# Patient Record
Sex: MALE | Race: WHITE | Employment: UNEMPLOYED | URBAN - METROPOLITAN AREA
[De-identification: names, ages, dates, MRNs, and addresses within clinical notes are randomized per-mention and may not be internally consistent; named-entity substitution may affect disease eponyms.]

---

## 2017-10-27 ENCOUNTER — OFFICE VISIT (OUTPATIENT)
Dept: PRIMARY CARE CLINIC | Age: 52
End: 2017-10-27

## 2017-10-27 VITALS
WEIGHT: 187 LBS | HEART RATE: 73 BPM | HEIGHT: 68 IN | SYSTOLIC BLOOD PRESSURE: 160 MMHG | BODY MASS INDEX: 28.34 KG/M2 | DIASTOLIC BLOOD PRESSURE: 104 MMHG | RESPIRATION RATE: 16 BRPM | OXYGEN SATURATION: 98 % | TEMPERATURE: 96.4 F

## 2017-10-27 DIAGNOSIS — R73.9 HYPERGLYCEMIA: ICD-10-CM

## 2017-10-27 DIAGNOSIS — Z00.00 PREVENTATIVE HEALTH CARE: ICD-10-CM

## 2017-10-27 DIAGNOSIS — G44.209 TENSION HEADACHE: ICD-10-CM

## 2017-10-27 DIAGNOSIS — I10 ESSENTIAL HYPERTENSION: ICD-10-CM

## 2017-10-27 DIAGNOSIS — F41.9 ANXIETY: Primary | ICD-10-CM

## 2017-10-27 DIAGNOSIS — Z12.5 PROSTATE CANCER SCREENING: ICD-10-CM

## 2017-10-27 DIAGNOSIS — Z13.31 POSITIVE DEPRESSION SCREENING: ICD-10-CM

## 2017-10-27 LAB
ALBUMIN SERPL-MCNC: 4.5 G/DL (ref 3.9–4.9)
ALP BLD-CCNC: 66 U/L (ref 35–104)
ALT SERPL-CCNC: 39 U/L (ref 0–41)
ANION GAP SERPL CALCULATED.3IONS-SCNC: 19 MEQ/L (ref 7–13)
AST SERPL-CCNC: 22 U/L (ref 0–40)
BASOPHILS ABSOLUTE: 0 K/UL (ref 0–0.2)
BASOPHILS RELATIVE PERCENT: 0.9 %
BILIRUB SERPL-MCNC: 0.3 MG/DL (ref 0–1.2)
BUN BLDV-MCNC: 12 MG/DL (ref 6–20)
CALCIUM SERPL-MCNC: 9.7 MG/DL (ref 8.6–10.2)
CHLORIDE BLD-SCNC: 95 MEQ/L (ref 98–107)
CHOLESTEROL, TOTAL: 278 MG/DL (ref 0–199)
CO2: 23 MEQ/L (ref 22–29)
CREAT SERPL-MCNC: 0.9 MG/DL (ref 0.7–1.2)
EOSINOPHILS ABSOLUTE: 0.1 K/UL (ref 0–0.7)
EOSINOPHILS RELATIVE PERCENT: 1.3 %
GFR AFRICAN AMERICAN: >60
GFR NON-AFRICAN AMERICAN: >60
GLOBULIN: 3.2 G/DL (ref 2.3–3.5)
GLUCOSE BLD-MCNC: 87 MG/DL (ref 74–109)
HBA1C MFR BLD: 5.3 % (ref 4.8–5.9)
HCT VFR BLD CALC: 49.8 % (ref 42–52)
HDLC SERPL-MCNC: 61 MG/DL (ref 40–59)
HEMOGLOBIN: 16.8 G/DL (ref 14–18)
LDL CHOLESTEROL CALCULATED: 184 MG/DL (ref 0–129)
LYMPHOCYTES ABSOLUTE: 1.2 K/UL (ref 1–4.8)
LYMPHOCYTES RELATIVE PERCENT: 25.8 %
MCH RBC QN AUTO: 32.6 PG (ref 27–31.3)
MCHC RBC AUTO-ENTMCNC: 33.8 % (ref 33–37)
MCV RBC AUTO: 96.5 FL (ref 80–100)
MONOCYTES ABSOLUTE: 0.5 K/UL (ref 0.2–0.8)
MONOCYTES RELATIVE PERCENT: 10.7 %
NEUTROPHILS ABSOLUTE: 2.9 K/UL (ref 1.4–6.5)
NEUTROPHILS RELATIVE PERCENT: 61.3 %
PDW BLD-RTO: 13.1 % (ref 11.5–14.5)
PLATELET # BLD: 281 K/UL (ref 130–400)
POTASSIUM SERPL-SCNC: 4.7 MEQ/L (ref 3.5–5.1)
PROSTATE SPECIFIC ANTIGEN: 0.67 NG/ML (ref 0–3.89)
RBC # BLD: 5.16 M/UL (ref 4.7–6.1)
SODIUM BLD-SCNC: 137 MEQ/L (ref 132–144)
TOTAL PROTEIN: 7.7 G/DL (ref 6.4–8.1)
TRIGL SERPL-MCNC: 165 MG/DL (ref 0–200)
TSH SERPL DL<=0.05 MIU/L-ACNC: 1 UIU/ML (ref 0.27–4.2)
WBC # BLD: 4.7 K/UL (ref 4.8–10.8)

## 2017-10-27 PROCEDURE — G8431 POS CLIN DEPRES SCRN F/U DOC: HCPCS | Performed by: INTERNAL MEDICINE

## 2017-10-27 PROCEDURE — 99214 OFFICE O/P EST MOD 30 MIN: CPT | Performed by: INTERNAL MEDICINE

## 2017-10-27 RX ORDER — DIAZEPAM 5 MG/1
5 TABLET ORAL 2 TIMES DAILY PRN
Qty: 60 TABLET | Refills: 2 | Status: SHIPPED | OUTPATIENT
Start: 2017-10-27 | End: 2017-12-26

## 2017-10-27 RX ORDER — LISINOPRIL 20 MG/1
20 TABLET ORAL DAILY
Qty: 30 TABLET | Refills: 5 | Status: SHIPPED | OUTPATIENT
Start: 2017-10-27 | End: 2018-07-30 | Stop reason: SDUPTHER

## 2017-10-27 ASSESSMENT — PATIENT HEALTH QUESTIONNAIRE - PHQ9
4. FEELING TIRED OR HAVING LITTLE ENERGY: 0
SUM OF ALL RESPONSES TO PHQ QUESTIONS 1-9: 11
2. FEELING DOWN, DEPRESSED OR HOPELESS: 3
8. MOVING OR SPEAKING SO SLOWLY THAT OTHER PEOPLE COULD HAVE NOTICED. OR THE OPPOSITE, BEING SO FIGETY OR RESTLESS THAT YOU HAVE BEEN MOVING AROUND A LOT MORE THAN USUAL: 2
7. TROUBLE CONCENTRATING ON THINGS, SUCH AS READING THE NEWSPAPER OR WATCHING TELEVISION: 1
1. LITTLE INTEREST OR PLEASURE IN DOING THINGS: 3
9. THOUGHTS THAT YOU WOULD BE BETTER OFF DEAD, OR OF HURTING YOURSELF: 0
3. TROUBLE FALLING OR STAYING ASLEEP: 2
10. IF YOU CHECKED OFF ANY PROBLEMS, HOW DIFFICULT HAVE THESE PROBLEMS MADE IT FOR YOU TO DO YOUR WORK, TAKE CARE OF THINGS AT HOME, OR GET ALONG WITH OTHER PEOPLE: 1
5. POOR APPETITE OR OVEREATING: 0
6. FEELING BAD ABOUT YOURSELF - OR THAT YOU ARE A FAILURE OR HAVE LET YOURSELF OR YOUR FAMILY DOWN: 0
SUM OF ALL RESPONSES TO PHQ9 QUESTIONS 1 & 2: 6

## 2017-10-30 ENCOUNTER — TELEPHONE (OUTPATIENT)
Dept: PRIMARY CARE CLINIC | Age: 52
End: 2017-10-30

## 2017-10-30 DIAGNOSIS — R59.0 LYMPHADENOPATHY, CERVICAL: Primary | ICD-10-CM

## 2017-10-30 DIAGNOSIS — E78.00 PURE HYPERCHOLESTEROLEMIA: Primary | ICD-10-CM

## 2017-10-30 RX ORDER — ATORVASTATIN CALCIUM 10 MG/1
10 TABLET, FILM COATED ORAL DAILY
Qty: 30 TABLET | Refills: 5 | Status: SHIPPED | OUTPATIENT
Start: 2017-10-30 | End: 2018-07-30 | Stop reason: SDUPTHER

## 2017-10-30 ASSESSMENT — ENCOUNTER SYMPTOMS
VOICE CHANGE: 0
SHORTNESS OF BREATH: 0
PHOTOPHOBIA: 0
TROUBLE SWALLOWING: 0
CHOKING: 0
VOMITING: 0
NAUSEA: 0
BLURRED VISION: 0

## 2018-01-05 DIAGNOSIS — N52.8 OTHER MALE ERECTILE DYSFUNCTION: ICD-10-CM

## 2018-01-07 RX ORDER — TADALAFIL 5 MG/1
5 TABLET ORAL DAILY
Qty: 30 TABLET | Refills: 5 | Status: SHIPPED | OUTPATIENT
Start: 2018-01-07 | End: 2018-07-30 | Stop reason: SDUPTHER

## 2018-01-08 ENCOUNTER — TELEPHONE (OUTPATIENT)
Dept: PRIMARY CARE CLINIC | Age: 53
End: 2018-01-08

## 2018-07-30 ENCOUNTER — OFFICE VISIT (OUTPATIENT)
Dept: PRIMARY CARE CLINIC | Age: 53
End: 2018-07-30
Payer: COMMERCIAL

## 2018-07-30 VITALS
WEIGHT: 178 LBS | BODY MASS INDEX: 26.98 KG/M2 | DIASTOLIC BLOOD PRESSURE: 80 MMHG | HEIGHT: 68 IN | SYSTOLIC BLOOD PRESSURE: 158 MMHG | HEART RATE: 100 BPM | RESPIRATION RATE: 16 BRPM

## 2018-07-30 DIAGNOSIS — E78.00 PURE HYPERCHOLESTEROLEMIA: ICD-10-CM

## 2018-07-30 DIAGNOSIS — F98.8 ATTENTION DEFICIT DISORDER (ADD) IN ADULT: ICD-10-CM

## 2018-07-30 DIAGNOSIS — F41.9 ANXIETY: ICD-10-CM

## 2018-07-30 DIAGNOSIS — K21.9 GASTROESOPHAGEAL REFLUX DISEASE WITHOUT ESOPHAGITIS: ICD-10-CM

## 2018-07-30 DIAGNOSIS — I10 ESSENTIAL HYPERTENSION: Primary | ICD-10-CM

## 2018-07-30 DIAGNOSIS — N52.8 OTHER MALE ERECTILE DYSFUNCTION: ICD-10-CM

## 2018-07-30 PROCEDURE — 99214 OFFICE O/P EST MOD 30 MIN: CPT | Performed by: INTERNAL MEDICINE

## 2018-07-30 RX ORDER — ESOMEPRAZOLE MAGNESIUM 20 MG/1
20 FOR SUSPENSION ORAL DAILY
Qty: 30 PACKET | Refills: 11 | Status: SHIPPED | OUTPATIENT
Start: 2018-07-30 | End: 2020-07-14

## 2018-07-30 RX ORDER — TADALAFIL 5 MG/1
5 TABLET ORAL DAILY
Qty: 30 TABLET | Refills: 11 | Status: SHIPPED | OUTPATIENT
Start: 2018-07-30 | End: 2020-07-14

## 2018-07-30 RX ORDER — ATORVASTATIN CALCIUM 10 MG/1
10 TABLET, FILM COATED ORAL DAILY
Qty: 30 TABLET | Refills: 11 | Status: SHIPPED | OUTPATIENT
Start: 2018-07-30 | End: 2020-07-14

## 2018-07-30 RX ORDER — ESOMEPRAZOLE MAGNESIUM 20 MG/1
20 FOR SUSPENSION ORAL DAILY
COMMUNITY
End: 2018-07-30 | Stop reason: SDUPTHER

## 2018-07-30 RX ORDER — LISINOPRIL 20 MG/1
20 TABLET ORAL DAILY
Qty: 30 TABLET | Refills: 11 | Status: SHIPPED | OUTPATIENT
Start: 2018-07-30 | End: 2018-08-08 | Stop reason: SDUPTHER

## 2018-07-30 RX ORDER — DIAZEPAM 5 MG/1
5 TABLET ORAL 2 TIMES DAILY PRN
Qty: 60 TABLET | Refills: 2 | Status: SHIPPED | OUTPATIENT
Start: 2018-07-30 | End: 2020-07-14 | Stop reason: SDUPTHER

## 2018-07-30 NOTE — PROGRESS NOTES
Alyson Lake 48 y.o. male presents today with   Chief Complaint   Patient presents with    Nephrolithiasis     Pt admits to kidney stones, drink a lot of water and waits until it passes. Pt having pain    Chest Pain     Pt admits to chest pains x1 week, tightness, Tingling in the hands. Pt went to [facility] Tuesday morning, Kept for 12 hours.  Hypertension     Pt here for follow up on blood pressure, would like a refill on Lisinopril and discuss the medication.  Hyperlipidemia     Pt here for follow up on Cholesterol, Pt would like a refill on Atorvastatin and discuss the medication. Mental Health Problem   The primary symptoms include dysphoric mood and somatic symptoms. The current episode started more than 1 month ago. This is a recurrent problem. Somatic symptoms include heartburn. Somatic symptoms do not include fatigue or headaches. The onset of the illness is precipitated by emotional stress and a stressful event. The degree of incapacity that he is experiencing as a consequence of his illness is mild. Additional symptoms of the illness include anhedonia, insomnia, attention impairment and distractible. Additional symptoms of the illness do not include fatigue or headaches. Hyperlipidemia   This is a chronic problem. The current episode started more than 1 year ago. The problem is controlled. Recent lipid tests were reviewed and are normal. Associated symptoms include chest pain. Pertinent negatives include no shortness of breath. Current antihyperlipidemic treatment includes statins. The current treatment provides significant improvement of lipids. Hypertension   This is a chronic problem. The current episode started more than 1 year ago. The problem is unchanged. The problem is controlled. Associated symptoms include anxiety and chest pain. Pertinent negatives include no headaches, palpitations or shortness of breath.    Gastroesophageal Reflux   He complains of chest pain

## 2018-08-02 ASSESSMENT — ENCOUNTER SYMPTOMS
PHOTOPHOBIA: 0
NAUSEA: 0
VOMITING: 0
HEARTBURN: 1
SHORTNESS OF BREATH: 0
VOICE CHANGE: 0
TROUBLE SWALLOWING: 0
CHOKING: 0

## 2018-08-07 ENCOUNTER — TELEPHONE (OUTPATIENT)
Dept: PRIMARY CARE CLINIC | Age: 53
End: 2018-08-07

## 2018-08-08 DIAGNOSIS — I10 ESSENTIAL HYPERTENSION: ICD-10-CM

## 2018-08-08 RX ORDER — LISINOPRIL 40 MG/1
40 TABLET ORAL DAILY
Qty: 30 TABLET | Refills: 2 | Status: SHIPPED | OUTPATIENT
Start: 2018-08-08 | End: 2018-08-13 | Stop reason: SDUPTHER

## 2018-08-13 ENCOUNTER — OFFICE VISIT (OUTPATIENT)
Dept: PRIMARY CARE CLINIC | Age: 53
End: 2018-08-13
Payer: COMMERCIAL

## 2018-08-13 VITALS
TEMPERATURE: 98.5 F | RESPIRATION RATE: 12 BRPM | BODY MASS INDEX: 27.13 KG/M2 | SYSTOLIC BLOOD PRESSURE: 138 MMHG | DIASTOLIC BLOOD PRESSURE: 80 MMHG | HEART RATE: 80 BPM | WEIGHT: 179 LBS | HEIGHT: 68 IN

## 2018-08-13 DIAGNOSIS — Z13.31 POSITIVE DEPRESSION SCREENING: ICD-10-CM

## 2018-08-13 DIAGNOSIS — N20.0 KIDNEY STONE ON LEFT SIDE: Primary | ICD-10-CM

## 2018-08-13 DIAGNOSIS — I10 ESSENTIAL HYPERTENSION: ICD-10-CM

## 2018-08-13 PROCEDURE — G8431 POS CLIN DEPRES SCRN F/U DOC: HCPCS | Performed by: INTERNAL MEDICINE

## 2018-08-13 PROCEDURE — 99214 OFFICE O/P EST MOD 30 MIN: CPT | Performed by: INTERNAL MEDICINE

## 2018-08-13 RX ORDER — OXYCODONE AND ACETAMINOPHEN 10; 325 MG/1; MG/1
1 TABLET ORAL EVERY 6 HOURS PRN
Qty: 28 TABLET | Refills: 0 | Status: SHIPPED | OUTPATIENT
Start: 2018-08-13 | End: 2018-08-28 | Stop reason: SDUPTHER

## 2018-08-13 RX ORDER — HYDROCHLOROTHIAZIDE 25 MG/1
25 TABLET ORAL DAILY PRN
Qty: 30 TABLET | Refills: 5 | Status: SHIPPED | OUTPATIENT
Start: 2018-08-13 | End: 2020-07-14

## 2018-08-13 RX ORDER — LISINOPRIL 40 MG/1
40 TABLET ORAL DAILY
Qty: 30 TABLET | Refills: 5 | Status: SHIPPED | OUTPATIENT
Start: 2018-08-13 | End: 2020-07-14

## 2018-08-13 NOTE — PROGRESS NOTES
Roby Quijano 48 y.o. male presents today with   Chief Complaint   Patient presents with    Nephrolithiasis     Pt admits to kidney stones, Pt in a lot of pain, Drinking a lot of water, waiting for it to pass. Pt has lack of sleep due to severe pain. Flank Pain   The current episode started 1 to 4 weeks ago. The problem occurs constantly. The problem has been waxing and waning since onset. The pain is present in the lumbar spine. The pain is at a severity of 7/10. The pain is severe. Pertinent negatives include no chest pain, fever or headaches. Hypertension   This is a chronic problem. The current episode started more than 1 year ago. The problem is unchanged. The problem is controlled. Associated symptoms include anxiety. Pertinent negatives include no chest pain, headaches, palpitations or shortness of breath. There are no associated agents to hypertension. Past Medical History:   Diagnosis Date    Alcohol abuse, in remission     Backache, unspecified     Chronic gastric ulcer without mention of hemorrhage or perforation, with obstruction     Decreased libido     Depressive disorder, not elsewhere classified     Generalized anxiety disorder     Other malaise and fatigue     Reflux esophagitis     Unspecified essential hypertension     Uric acid nephrolithiasis      Patient Active Problem List    Diagnosis Date Noted    Avascular necrosis of bone ([facility] [address] 07/23/2015    Effusion of joint 07/15/2015    Acute medial meniscal tear 07/15/2015    Kidney stones 12/22/2011     Past Surgical History:   Procedure Laterality Date    ANTERIOR CRUCIATE LIGAMENT REPAIR      BACK SURGERY      COLONOSCOPY  2012    LITHOTRIPSY       No family history on file.   Social History     Social History    Marital status: Single     Spouse name: N/A    Number of children: N/A    Years of education: N/A     Social History Main Topics    Smoking status: Current Every Day Smoker     Packs/day: 0.50     Years: 35.00    Smokeless tobacco: Never Used    Alcohol use No    Drug use: Unknown    Sexual activity: Not Asked     Other Topics Concern    None     Social History Narrative    None     No Known Allergies    Review of Systems   Constitutional: Negative for fatigue and fever. HENT: Negative for trouble swallowing and voice change. Eyes: Negative for photophobia and visual disturbance. Respiratory: Negative for choking and shortness of breath. Cardiovascular: Negative for chest pain and palpitations. Gastrointestinal: Negative for nausea and vomiting. Genitourinary: Positive for flank pain. Negative for decreased urine volume, testicular pain and urgency. Skin: Negative for rash. Neurological: Negative for tremors, syncope and headaches. Hematological: Does not bruise/bleed easily. Psychiatric/Behavioral: Negative for suicidal ideas. Vitals:    08/13/18 1511   BP: 138/80   Site: Right Arm   Position: Sitting   Cuff Size: Medium Adult   Pulse: 80   Resp: 12   Temp: 98.5 °F (36.9 °C)   Weight: 179 lb (81.2 kg)   Height: 5' 7.5\" (1.715 m)       Physical Exam   Constitutional: He appears well-developed and well-nourished. HENT:   Head: Normocephalic and atraumatic. Eyes: Pupils are equal, round, and reactive to light. Conjunctivae and EOM are normal.   Neck: Normal range of motion. No thyromegaly present. Cardiovascular: Normal rate and regular rhythm. Pulmonary/Chest: Effort normal. No respiratory distress. He has no wheezes. Abdominal: Soft. He exhibits no distension. There is no tenderness. Musculoskeletal: Normal range of motion. Neurological: He is alert. Skin: Skin is dry. Assessment/Plan  Krystyna Solano was seen today for nephrolithiasis. Diagnoses and all orders for this visit:    Kidney stone on left side  -     oxyCODONE-acetaminophen (PERCOCET)  MG per tablet; Take 1 tablet by mouth every 6 hours as needed for Pain for up to 7 days.  Intended supply: 30 days.  -     CT ABDOMEN PELVIS WO CONTRAST Additional Contrast? None    Essential hypertension  -     hydrochlorothiazide (HYDRODIURIL) 25 MG tablet; Take 1 tablet by mouth daily as needed (PRN)  -     lisinopril (PRINIVIL;ZESTRIL) 40 MG tablet; Take 1 tablet by mouth daily    Positive depression screening  -     Positive Screen for Clinical Depression with a Documented Follow-up Plan       Return in about 3 months (around 11/13/2018), or if symptoms worsen or fail to improve.     Pepper Razo MD      On the basis of positive PHQ-9 screening ( ), the following plan was implemented: discussed with patient

## 2018-08-16 ENCOUNTER — OFFICE VISIT (OUTPATIENT)
Dept: BEHAVIORAL/MENTAL HEALTH CLINIC | Age: 53
End: 2018-08-16
Payer: COMMERCIAL

## 2018-08-16 DIAGNOSIS — Z13.39 ADHD (ATTENTION DEFICIT HYPERACTIVITY DISORDER) EVALUATION: Primary | ICD-10-CM

## 2018-08-16 PROCEDURE — 90791 PSYCH DIAGNOSTIC EVALUATION: CPT | Performed by: PSYCHOLOGIST

## 2018-08-16 ASSESSMENT — ANXIETY QUESTIONNAIRES
7. FEELING AFRAID AS IF SOMETHING AWFUL MIGHT HAPPEN: 2-OVER HALF THE DAYS
6. BECOMING EASILY ANNOYED OR IRRITABLE: 3-NEARLY EVERY DAY
5. BEING SO RESTLESS THAT IT IS HARD TO SIT STILL: 3-NEARLY EVERY DAY
GAD7 TOTAL SCORE: 20
2. NOT BEING ABLE TO STOP OR CONTROL WORRYING: 3-NEARLY EVERY DAY
3. WORRYING TOO MUCH ABOUT DIFFERENT THINGS: 3-NEARLY EVERY DAY
4. TROUBLE RELAXING: 3-NEARLY EVERY DAY
1. FEELING NERVOUS, ANXIOUS, OR ON EDGE: 3-NEARLY EVERY DAY

## 2018-08-16 ASSESSMENT — ENCOUNTER SYMPTOMS
CHOKING: 0
NAUSEA: 0
SHORTNESS OF BREATH: 0
VOICE CHANGE: 0
TROUBLE SWALLOWING: 0
PHOTOPHOBIA: 0
VOMITING: 0

## 2018-08-16 ASSESSMENT — PATIENT HEALTH QUESTIONNAIRE - PHQ9
6. FEELING BAD ABOUT YOURSELF - OR THAT YOU ARE A FAILURE OR HAVE LET YOURSELF OR YOUR FAMILY DOWN: 2
SUM OF ALL RESPONSES TO PHQ QUESTIONS 1-9: 22
3. TROUBLE FALLING OR STAYING ASLEEP: 3
5. POOR APPETITE OR OVEREATING: 2
9. THOUGHTS THAT YOU WOULD BE BETTER OFF DEAD, OR OF HURTING YOURSELF: 1
8. MOVING OR SPEAKING SO SLOWLY THAT OTHER PEOPLE COULD HAVE NOTICED. OR THE OPPOSITE, BEING SO FIGETY OR RESTLESS THAT YOU HAVE BEEN MOVING AROUND A LOT MORE THAN USUAL: 2
SUM OF ALL RESPONSES TO PHQ QUESTIONS 1-9: 22
2. FEELING DOWN, DEPRESSED OR HOPELESS: 3
SUM OF ALL RESPONSES TO PHQ9 QUESTIONS 1 & 2: 6
7. TROUBLE CONCENTRATING ON THINGS, SUCH AS READING THE NEWSPAPER OR WATCHING TELEVISION: 3
4. FEELING TIRED OR HAVING LITTLE ENERGY: 3
1. LITTLE INTEREST OR PLEASURE IN DOING THINGS: 3
10. IF YOU CHECKED OFF ANY PROBLEMS, HOW DIFFICULT HAVE THESE PROBLEMS MADE IT FOR YOU TO DO YOUR WORK, TAKE CARE OF THINGS AT HOME, OR GET ALONG WITH OTHER PEOPLE: 3
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
## 2018-08-28 ENCOUNTER — OFFICE VISIT (OUTPATIENT)
Dept: PRIMARY CARE CLINIC | Age: 53
End: 2018-08-28
Payer: COMMERCIAL

## 2018-08-28 ENCOUNTER — TELEPHONE (OUTPATIENT)
Dept: BEHAVIORAL/MENTAL HEALTH CLINIC | Age: 53
End: 2018-08-28

## 2018-08-28 ENCOUNTER — OFFICE VISIT (OUTPATIENT)
Dept: BEHAVIORAL/MENTAL HEALTH CLINIC | Age: 53
End: 2018-08-28
Payer: COMMERCIAL

## 2018-08-28 VITALS
TEMPERATURE: 98 F | HEART RATE: 77 BPM | BODY MASS INDEX: 28.41 KG/M2 | RESPIRATION RATE: 18 BRPM | OXYGEN SATURATION: 98 % | SYSTOLIC BLOOD PRESSURE: 138 MMHG | WEIGHT: 181 LBS | DIASTOLIC BLOOD PRESSURE: 90 MMHG | HEIGHT: 67 IN

## 2018-08-28 DIAGNOSIS — F41.9 ANXIETY: ICD-10-CM

## 2018-08-28 DIAGNOSIS — F98.8 ATTENTION DEFICIT DISORDER (ADD) IN ADULT: Primary | ICD-10-CM

## 2018-08-28 DIAGNOSIS — F17.200 SMOKER: ICD-10-CM

## 2018-08-28 DIAGNOSIS — F90.9 ATTENTION DEFICIT HYPERACTIVITY DISORDER (ADHD), UNSPECIFIED ADHD TYPE: ICD-10-CM

## 2018-08-28 DIAGNOSIS — Z12.11 COLON CANCER SCREENING: ICD-10-CM

## 2018-08-28 DIAGNOSIS — N20.0 KIDNEY STONE ON LEFT SIDE: ICD-10-CM

## 2018-08-28 DIAGNOSIS — F32.A DEPRESSION, UNSPECIFIED DEPRESSION TYPE: ICD-10-CM

## 2018-08-28 PROCEDURE — 99214 OFFICE O/P EST MOD 30 MIN: CPT | Performed by: INTERNAL MEDICINE

## 2018-08-28 PROCEDURE — 90832 PSYTX W PT 30 MINUTES: CPT | Performed by: PSYCHOLOGIST

## 2018-08-28 RX ORDER — OXYCODONE AND ACETAMINOPHEN 10; 325 MG/1; MG/1
1 TABLET ORAL EVERY 6 HOURS PRN
Qty: 20 TABLET | Refills: 0 | Status: SHIPPED | OUTPATIENT
Start: 2018-08-28 | End: 2018-09-02

## 2018-08-28 RX ORDER — VARENICLINE TARTRATE 1 MG/1
1 TABLET, FILM COATED ORAL 2 TIMES DAILY
Qty: 60 TABLET | Refills: 5 | Status: SHIPPED | OUTPATIENT
Start: 2018-08-28 | End: 2020-07-14

## 2018-08-28 RX ORDER — VARENICLINE TARTRATE 25 MG
KIT ORAL
Qty: 1 EACH | Refills: 0 | Status: SHIPPED | OUTPATIENT
Start: 2018-08-28 | End: 2020-07-14

## 2018-08-28 RX ORDER — DEXTROAMPHETAMINE SACCHARATE, AMPHETAMINE ASPARTATE, DEXTROAMPHETAMINE SULFATE AND AMPHETAMINE SULFATE 2.5; 2.5; 2.5; 2.5 MG/1; MG/1; MG/1; MG/1
10 TABLET ORAL 2 TIMES DAILY
Qty: 60 TABLET | Refills: 0 | Status: SHIPPED | OUTPATIENT
Start: 2018-08-28 | End: 2020-07-14

## 2018-08-28 ASSESSMENT — PATIENT HEALTH QUESTIONNAIRE - PHQ9
4. FEELING TIRED OR HAVING LITTLE ENERGY: 1
8. MOVING OR SPEAKING SO SLOWLY THAT OTHER PEOPLE COULD HAVE NOTICED. OR THE OPPOSITE, BEING SO FIGETY OR RESTLESS THAT YOU HAVE BEEN MOVING AROUND A LOT MORE THAN USUAL: 1
2. FEELING DOWN, DEPRESSED OR HOPELESS: 1
5. POOR APPETITE OR OVEREATING: 1
6. FEELING BAD ABOUT YOURSELF - OR THAT YOU ARE A FAILURE OR HAVE LET YOURSELF OR YOUR FAMILY DOWN: 1
10. IF YOU CHECKED OFF ANY PROBLEMS, HOW DIFFICULT HAVE THESE PROBLEMS MADE IT FOR YOU TO DO YOUR WORK, TAKE CARE OF THINGS AT HOME, OR GET ALONG WITH OTHER PEOPLE: 1
7. TROUBLE CONCENTRATING ON THINGS, SUCH AS READING THE NEWSPAPER OR WATCHING TELEVISION: 3
SUM OF ALL RESPONSES TO PHQ QUESTIONS 1-9: 11
SUM OF ALL RESPONSES TO PHQ QUESTIONS 1-9: 11
SUM OF ALL RESPONSES TO PHQ9 QUESTIONS 1 & 2: 2
1. LITTLE INTEREST OR PLEASURE IN DOING THINGS: 1
9. THOUGHTS THAT YOU WOULD BE BETTER OFF DEAD, OR OF HURTING YOURSELF: 0
3. TROUBLE FALLING OR STAYING ASLEEP: 2

## 2018-08-28 NOTE — PROGRESS NOTES
Kayce Khoury 48 y.o. male presents today with   Chief Complaint   Patient presents with    ADHD     Patient is here today due to ADHD recommended by Dr. Michael Durbin.  Health Maintenance     CRC ordered       Mental Health Problem   The primary symptoms include dysphoric mood. The current episode started more than 1 month ago. The onset of the illness is precipitated by emotional stress and a stressful event. The degree of incapacity that he is experiencing as a consequence of his illness is mild. Additional symptoms of the illness include attention impairment and distractible. Additional symptoms of the illness do not include fatigue. Flank Pain   This is a recurrent problem. The current episode started in the past 7 days. The problem occurs daily. The problem has been gradually improving since onset. The pain is present in the lumbar spine. The pain is at a severity of 7/10. The pain is severe. Pertinent negatives include no chest pain or fever. Past Medical History:   Diagnosis Date    Alcohol abuse, in remission     Backache, unspecified     Chronic gastric ulcer without mention of hemorrhage or perforation, with obstruction     Decreased libido     Depressive disorder, not elsewhere classified     Generalized anxiety disorder     Other malaise and fatigue     Reflux esophagitis     Unspecified essential hypertension     Uric acid nephrolithiasis      Patient Active Problem List    Diagnosis Date Noted    ADHD 08/28/2018    Anxiety 08/28/2018    Depression 08/28/2018    Avascular necrosis of bone ([facility] [address] 07/23/2015    Effusion of joint 07/15/2015    Acute medial meniscal tear 07/15/2015    Kidney stones 12/22/2011     Past Surgical History:   Procedure Laterality Date    ANTERIOR CRUCIATE LIGAMENT REPAIR      BACK SURGERY      COLONOSCOPY  2012    LITHOTRIPSY       No family history on file.   Social History     Social History    Marital status: Single     Spouse name: N/A    Number of children: N/A    Years of education: N/A     Social History Main Topics    Smoking status: Current Every Day Smoker     Packs/day: 0.50     Years: 35.00    Smokeless tobacco: Never Used    Alcohol use No    Drug use: Unknown    Sexual activity: Not Asked     Other Topics Concern    None     Social History Narrative    None     No Known Allergies    Review of Systems   Constitutional: Negative for fatigue and fever. HENT: Negative for trouble swallowing and voice change. Eyes: Negative for photophobia and visual disturbance. Respiratory: Negative for choking and shortness of breath. Cardiovascular: Negative for chest pain and palpitations. Gastrointestinal: Negative for nausea and vomiting. Genitourinary: Positive for flank pain. Negative for decreased urine volume, testicular pain and urgency. Skin: Negative for rash. Neurological: Negative for tremors and syncope. Hematological: Does not bruise/bleed easily. Psychiatric/Behavioral: Positive for dysphoric mood. Negative for suicidal ideas. Vitals:    08/28/18 1209   BP: (!) 138/90   Site: Right Arm   Position: Sitting   Cuff Size: Medium Adult   Pulse: 77   Resp: 18   Temp: 98 °F (36.7 °C)   TempSrc: Tympanic   SpO2: 98%   Weight: 181 lb (82.1 kg)   Height: 5' 6.5\" (1.689 m)       Physical Exam   Constitutional: He appears well-developed and well-nourished. HENT:   Head: Normocephalic and atraumatic. Eyes: Pupils are equal, round, and reactive to light. Conjunctivae and EOM are normal.   Neck: Normal range of motion. No thyromegaly present. Cardiovascular: Normal rate and regular rhythm. Pulmonary/Chest: Effort normal. He has no wheezes. Abdominal: Soft. He exhibits no distension. Musculoskeletal: Normal range of motion. Neurological: He is alert. Skin: Skin is dry. Assessment/Plan  Nika Kim was seen today for adhd and health maintenance.     Diagnoses and all orders for this visit:    Attention

## 2018-08-28 NOTE — TELEPHONE ENCOUNTER
Pt's screener is consistent with ADHD, he is interested in starting a stimulant medication. Do you want him to schedule an appointment?

## 2018-08-30 ASSESSMENT — ENCOUNTER SYMPTOMS
SHORTNESS OF BREATH: 0
NAUSEA: 0
VOICE CHANGE: 0
PHOTOPHOBIA: 0
TROUBLE SWALLOWING: 0
VOMITING: 0
CHOKING: 0

## 2018-08-31 ENCOUNTER — TELEPHONE (OUTPATIENT)
Dept: PRIMARY CARE CLINIC | Age: 53
End: 2018-08-31

## 2018-09-28 ENCOUNTER — OFFICE VISIT (OUTPATIENT)
Dept: PRIMARY CARE CLINIC | Age: 53
End: 2018-09-28
Payer: COMMERCIAL

## 2018-09-28 VITALS
RESPIRATION RATE: 16 BRPM | OXYGEN SATURATION: 97 % | HEIGHT: 68 IN | SYSTOLIC BLOOD PRESSURE: 130 MMHG | BODY MASS INDEX: 27.58 KG/M2 | HEART RATE: 75 BPM | WEIGHT: 182 LBS | TEMPERATURE: 97.6 F | DIASTOLIC BLOOD PRESSURE: 88 MMHG

## 2018-09-28 DIAGNOSIS — F98.8 ATTENTION DEFICIT DISORDER (ADD) IN ADULT: ICD-10-CM

## 2018-09-28 PROCEDURE — 99213 OFFICE O/P EST LOW 20 MIN: CPT | Performed by: INTERNAL MEDICINE

## 2018-09-28 RX ORDER — DEXTROAMPHETAMINE SACCHARATE, AMPHETAMINE ASPARTATE, DEXTROAMPHETAMINE SULFATE AND AMPHETAMINE SULFATE 5; 5; 5; 5 MG/1; MG/1; MG/1; MG/1
20 TABLET ORAL 2 TIMES DAILY
Qty: 60 TABLET | Refills: 0 | Status: SHIPPED | OUTPATIENT
Start: 2018-09-28 | End: 2018-09-28 | Stop reason: SDUPTHER

## 2018-09-28 RX ORDER — DEXTROAMPHETAMINE SACCHARATE, AMPHETAMINE ASPARTATE, DEXTROAMPHETAMINE SULFATE AND AMPHETAMINE SULFATE 2.5; 2.5; 2.5; 2.5 MG/1; MG/1; MG/1; MG/1
10 TABLET ORAL 2 TIMES DAILY
Qty: 60 TABLET | Refills: 0 | Status: CANCELLED | OUTPATIENT
Start: 2018-09-28 | End: 2018-10-28

## 2018-09-28 RX ORDER — DEXTROAMPHETAMINE SACCHARATE, AMPHETAMINE ASPARTATE, DEXTROAMPHETAMINE SULFATE AND AMPHETAMINE SULFATE 5; 5; 5; 5 MG/1; MG/1; MG/1; MG/1
20 TABLET ORAL 2 TIMES DAILY
Qty: 60 TABLET | Refills: 0 | Status: SHIPPED | OUTPATIENT
Start: 2018-11-28 | End: 2020-07-14

## 2018-09-28 RX ORDER — DEXTROAMPHETAMINE SACCHARATE, AMPHETAMINE ASPARTATE, DEXTROAMPHETAMINE SULFATE AND AMPHETAMINE SULFATE 5; 5; 5; 5 MG/1; MG/1; MG/1; MG/1
20 TABLET ORAL 2 TIMES DAILY
Qty: 60 TABLET | Refills: 0 | Status: SHIPPED | OUTPATIENT
Start: 2018-10-28 | End: 2018-09-28 | Stop reason: SDUPTHER

## 2018-09-28 ASSESSMENT — ENCOUNTER SYMPTOMS
PHOTOPHOBIA: 0
VOICE CHANGE: 0
NAUSEA: 0
CHOKING: 0
SHORTNESS OF BREATH: 0
TROUBLE SWALLOWING: 0
VOMITING: 0

## 2018-09-28 NOTE — PROGRESS NOTES
Pawan Craven 48 y.o. male presents today with   Chief Complaint   Patient presents with    ADHD     Pt here for follow up on ADHD, Pt taking Adderall, needs refill. Pt admits to a highly stressful month and is not sure how the adderall helped. Mental Health Problem   The primary symptoms include dysphoric mood. The current episode started more than 1 month ago. The onset of the illness is precipitated by emotional stress and a stressful event. The degree of incapacity that he is experiencing as a consequence of his illness is mild. Additional symptoms of the illness include attention impairment and distractible. Additional symptoms of the illness do not include fatigue. He does not admit to suicidal ideas. Past Medical History:   Diagnosis Date    Alcohol abuse, in remission     Backache, unspecified     Chronic gastric ulcer without mention of hemorrhage or perforation, with obstruction     Decreased libido     Depressive disorder, not elsewhere classified     Generalized anxiety disorder     Other malaise and fatigue     Reflux esophagitis     Unspecified essential hypertension     Uric acid nephrolithiasis      Patient Active Problem List    Diagnosis Date Noted    ADHD 08/28/2018    Anxiety 08/28/2018    Depression 08/28/2018    Avascular necrosis of bone ([facility] [address] 07/23/2015    Effusion of joint 07/15/2015    Acute medial meniscal tear 07/15/2015    Kidney stones 12/22/2011     Past Surgical History:   Procedure Laterality Date    ANTERIOR CRUCIATE LIGAMENT REPAIR      BACK SURGERY      COLONOSCOPY  2012    LITHOTRIPSY       No family history on file.   Social History     Social History    Marital status: Single     Spouse name: N/A    Number of children: N/A    Years of education: N/A     Social History Main Topics    Smoking status: Current Every Day Smoker     Packs/day: 0.50     Years: 35.00    Smokeless tobacco: Never Used    Alcohol use No    Drug use: Unknown   

## 2019-04-29 ENCOUNTER — TELEPHONE (OUTPATIENT)
Dept: FAMILY MEDICINE CLINIC | Age: 54
End: 2019-04-29

## 2019-05-06 NOTE — TELEPHONE ENCOUNTER
Called [facility] to expedite pre authorization for rolo. Sterling Garcia will send an approval or denial fax in 24 to 48hrs.

## 2020-07-06 ENCOUNTER — NURSE TRIAGE (OUTPATIENT)
Dept: OTHER | Facility: CLINIC | Age: 55
End: 2020-07-06

## 2020-07-06 NOTE — TELEPHONE ENCOUNTER
Reason for Disposition   Symptoms interfere with work or school    Answer Assessment - Initial Assessment Questions  1. CONCERN: \"What happened that made you call today? \"      Anxiety issues  2. ANXIETY SYMPTOM SCREENING: \"Can you describe how you have been feeling? \"  (e.g., tense, restless, panicky, anxious, keyed up, trouble sleeping, trouble concentrating)        3. ONSET: \"How long have you been feeling this way?\"        4. RECURRENT: \"Have you felt this way before? \"  If yes: \"What happened that time? \" \"What helped these feelings go away in the past?\"       *No Answer*  5. RISK OF HARM - SUICIDAL IDEATION:  \"Do you ever have thoughts of hurting or killing yourself? \"  (e.g., yes, no, no but preoccupation with thoughts about death)    - INTENT:  \"Do you have thoughts of hurting or killing yourself right NOW? \" (e.g., yes, no, N/A)    - PLAN: \"Do you have a specific plan for how you would do this? \" (e.g., gun, knife, overdose, no plan, N/A)        6. RISK OF HARM - HOMICIDAL IDEATION:  \"Do you ever have thoughts of hurting or killing someone else? \"  (e.g., yes, no, no but preoccupation with thoughts about death)    - INTENT:  \"Do you have thoughts of hurting or killing someone right NOW? \" (e.g., yes, no, N/A)    - PLAN: \"Do you have a specific plan for how you would do this? \" (e.g., gun, knife, no plan, N/A)       *No Answer*  7. FUNCTIONAL IMPAIRMENT: \"How have things been going for you overall in your life? Have you had any more difficulties than usual doing your normal daily activities? \"  (e.g., better, same, worse; self-care, school, work, interactions)      *No Answer*  8. SUPPORT: \"Who is with you now? \" \"Who do you live with?\" \"Do you have family or friends nearby who you can talk to?\"       *No Answer*  9. THERAPIST: \"Do you have a counselor or therapist? Name? \"      *No Answer*  10. STRESSORS: \"Has there been any new stress or recent changes in your life? \"        Going back to work wearing mask   11. CAFFEINE ABUSE: \"Do you drink caffeinated beverages, and how much each day? \" (e.g., coffee, tea, magalys)        *No Answer*  12. SUBSTANCE ABUSE: \"Do you use any illegal drugs or alcohol? \"        *No Answer*  13. OTHER SYMPTOMS: \"Do you have any other physical symptoms right now? \" (e.g., chest pain, palpitations, difficulty breathing, fever)        *No Answer*  14. PREGNANCY: \"Is there any chance you are pregnant? \" \"When was your last menstrual period? \"        *No Answer*    Protocols used: ANXIETY AND PANIC ATTACK-ADULT-[place]    Out of medications also for about 1 1/2 year and hasn't seen PCP for a while

## 2020-07-14 ENCOUNTER — OFFICE VISIT (OUTPATIENT)
Dept: PRIMARY CARE CLINIC | Age: 55
End: 2020-07-14
Payer: COMMERCIAL

## 2020-07-14 VITALS
HEART RATE: 105 BPM | OXYGEN SATURATION: 98 % | BODY MASS INDEX: 27.43 KG/M2 | HEIGHT: 68 IN | WEIGHT: 181 LBS | TEMPERATURE: 97.8 F | SYSTOLIC BLOOD PRESSURE: 158 MMHG | RESPIRATION RATE: 14 BRPM | DIASTOLIC BLOOD PRESSURE: 100 MMHG

## 2020-07-14 PROCEDURE — 99214 OFFICE O/P EST MOD 30 MIN: CPT | Performed by: INTERNAL MEDICINE

## 2020-07-14 PROCEDURE — 3017F COLORECTAL CA SCREEN DOC REV: CPT | Performed by: INTERNAL MEDICINE

## 2020-07-14 PROCEDURE — G8427 DOCREV CUR MEDS BY ELIG CLIN: HCPCS | Performed by: INTERNAL MEDICINE

## 2020-07-14 PROCEDURE — G8419 CALC BMI OUT NRM PARAM NOF/U: HCPCS | Performed by: INTERNAL MEDICINE

## 2020-07-14 PROCEDURE — 4004F PT TOBACCO SCREEN RCVD TLK: CPT | Performed by: INTERNAL MEDICINE

## 2020-07-14 RX ORDER — HYDROCHLOROTHIAZIDE 25 MG/1
25 TABLET ORAL DAILY PRN
Qty: 30 TABLET | Refills: 5 | Status: ON HOLD | OUTPATIENT
Start: 2020-07-14 | End: 2021-04-04 | Stop reason: HOSPADM

## 2020-07-14 RX ORDER — LISINOPRIL 40 MG/1
40 TABLET ORAL DAILY
Qty: 30 TABLET | Refills: 5 | Status: SHIPPED | OUTPATIENT
Start: 2020-07-14 | End: 2022-05-09

## 2020-07-14 RX ORDER — DIAZEPAM 5 MG/1
5 TABLET ORAL 2 TIMES DAILY PRN
Qty: 60 TABLET | Refills: 2 | Status: SHIPPED | OUTPATIENT
Start: 2020-07-14 | End: 2020-09-12

## 2020-07-14 SDOH — ECONOMIC STABILITY: INCOME INSECURITY: HOW HARD IS IT FOR YOU TO PAY FOR THE VERY BASICS LIKE FOOD, HOUSING, MEDICAL CARE, AND HEATING?: NOT HARD AT ALL

## 2020-07-14 SDOH — ECONOMIC STABILITY: FOOD INSECURITY: WITHIN THE PAST 12 MONTHS, YOU WORRIED THAT YOUR FOOD WOULD RUN OUT BEFORE YOU GOT MONEY TO BUY MORE.: NEVER TRUE

## 2020-07-14 SDOH — ECONOMIC STABILITY: FOOD INSECURITY: WITHIN THE PAST 12 MONTHS, THE FOOD YOU BOUGHT JUST DIDN'T LAST AND YOU DIDN'T HAVE MONEY TO GET MORE.: NEVER TRUE

## 2020-07-14 ASSESSMENT — ENCOUNTER SYMPTOMS
PHOTOPHOBIA: 0
CHOKING: 0
TROUBLE SWALLOWING: 0
NAUSEA: 0
SHORTNESS OF BREATH: 0
ABDOMINAL DISTENTION: 0
VOICE CHANGE: 0
VOMITING: 0

## 2020-07-14 NOTE — PROGRESS NOTES
Leanne Mendez 54 y.o. male presents today with   Chief Complaint   Patient presents with    Mental Health Problem     Follow up on Anxiety, Panic attacks. Left arm tingling/numb when having an attack, radiates to neck.  Hypertension     high blood pressure follow up     Nephrolithiasis     pain, blood in urine. Hx of kidney stones       Hypertension   This is a chronic problem. The current episode started more than 1 year ago. Progression since onset: off meds. Associated symptoms include anxiety. Pertinent negatives include no chest pain, headaches, palpitations or shortness of breath. Mental Health Problem   The primary symptoms include dysphoric mood and somatic symptoms. The current episode started more than 1 month ago. This is a recurrent problem. The somatic symptoms began more than 1 month ago. The somatic symptoms have been unchanged since their onset. The symptoms are moderate. Somatic symptoms do not include fatigue or headaches. Additional symptoms of the illness include anhedonia, insomnia, agitation and attention impairment. Additional symptoms of the illness do not include fatigue or headaches. He admits to suicidal ideas. Flank Pain   This is a new problem. The current episode started in the past 7 days. The problem occurs daily. The problem has been resolved since onset. The pain is present in the lumbar spine. The pain is at a severity of 4/10. The pain is moderate. The symptoms are aggravated by twisting. Pertinent negatives include no chest pain, fever or headaches. (Blood urine, had)    new growth on left forehead. Hx skin ca.     Past Medical History:   Diagnosis Date    Alcohol abuse, in remission     Backache, unspecified     Chronic gastric ulcer without mention of hemorrhage or perforation, with obstruction     Decreased libido     Depressive disorder, not elsewhere classified     Generalized anxiety disorder     Other malaise and fatigue     Reflux esophagitis     Unspecified essential hypertension     Uric acid nephrolithiasis      Patient Active Problem List    Diagnosis Date Noted    ADHD 08/28/2018    Anxiety 08/28/2018    Depression 08/28/2018    Avascular necrosis of bone ([address] 07/23/2015    Effusion of joint 07/15/2015    Acute medial meniscal tear 07/15/2015    Kidney stones 12/22/2011     Past Surgical History:   Procedure Laterality Date    ANTERIOR CRUCIATE LIGAMENT REPAIR      BACK SURGERY      COLONOSCOPY  2012    LITHOTRIPSY       No family history on file. Social History     Socioeconomic History    Marital status: Single     Spouse name: None    Number of children: None    Years of education: None    Highest education level: None   Occupational History    None   Social Needs    Financial resource strain: Not hard at all   [employer] insecurity     Worry: Never true     Inability: Never true   [employer] needs     Medical: None     Non-medical: None   Tobacco Use    Smoking status: Current Every Day Smoker     Packs/day: 0.50     Years: 35.00     Pack years: 17.50    Smokeless tobacco: Never Used   Substance and Sexual Activity    Alcohol use: No     Alcohol/week: 0.0 standard drinks    Drug use: None    Sexual activity: None   Lifestyle    Physical activity     Days per week: None     Minutes per session: None    Stress: None   Relationships    Social connections     Talks on phone: None     Gets together: None     Attends Druze service: None     Active member of club or organization: None     Attends meetings of clubs or organizations: None     Relationship status: None    Intimate partner violence     Fear of current or ex partner: None     Emotionally abused: None     Physically abused: None     Forced sexual activity: None   Other Topics Concern    None   Social History Narrative    None     No Known Allergies    Review of Systems   Constitutional: Negative for fatigue and fever.    HENT: Negative for congestion, trouble swallowing and voice change. Eyes: Negative for photophobia and visual disturbance. Respiratory: Negative for choking and shortness of breath. Cardiovascular: Negative for chest pain and palpitations. Gastrointestinal: Negative for abdominal distention, nausea and vomiting. Genitourinary: Positive for flank pain. Negative for decreased urine volume, testicular pain and urgency. Musculoskeletal: Positive for arthralgias. Skin: Negative for rash. Neurological: Negative for tremors, syncope and headaches. Hematological: Does not bruise/bleed easily. Psychiatric/Behavioral: Positive for agitation and dysphoric mood. Negative for suicidal ideas. The patient has insomnia. Vitals:    07/14/20 1106 07/14/20 1114   BP: (!) 158/100 (!) 158/100   Pulse: 105    Resp: 14    Temp: 97.8 °F (36.6 °C)    SpO2: 98%    Weight: 181 lb (82.1 kg)    Height: 5' 7.5\" (1.715 m)        Physical Exam  Constitutional:       Appearance: He is well-developed. HENT:      Head: Normocephalic and atraumatic. Nose: Nose normal.      Mouth/Throat:      Mouth: Mucous membranes are moist.   Eyes:      Conjunctiva/sclera: Conjunctivae normal.      Pupils: Pupils are equal, round, and reactive to light. Neck:      Musculoskeletal: Normal range of motion. Cardiovascular:      Rate and Rhythm: Normal rate and regular rhythm. Pulmonary:      Effort: Pulmonary effort is normal. No respiratory distress. Breath sounds: No wheezing. Abdominal:      General: Bowel sounds are normal. There is no distension. Palpations: Abdomen is soft. Tenderness: There is no abdominal tenderness. Musculoskeletal: Normal range of motion. Skin:     General: Skin is dry. Coloration: Skin is not jaundiced. Neurological:      Mental Status: He is alert. Cranial Nerves: No cranial nerve deficit.    Psychiatric:         Mood and Affect: Mood normal.        Assessment/Plan  Carolyne Uriarte was seen today for mental health problem, hypertension and nephrolithiasis. Diagnoses and all orders for this visit:    Kidney stone on left side  -     CT ABDOMEN PELVIS WO CONTRAST Additional Contrast? None; Future    Essential hypertension  -     lisinopril (PRINIVIL;ZESTRIL) 40 MG tablet; Take 1 tablet by mouth daily  -     hydroCHLOROthiazide (HYDRODIURIL) 25 MG tablet; Take 1 tablet by mouth daily as needed (PRN)    Anxiety  -     diazePAM (VALIUM) 5 MG tablet; Take 1 tablet by mouth 2 times daily as needed for Anxiety or Sleep for up to 60 days. Colon cancer screening  -     [address] Colonoscopy    Preventative health care  -     PSA Screening; Future  -     Comprehensive Metabolic Panel; Future  -     CBC With Auto Differential; Future  -     Lipid Panel; Future    Prostate cancer screening  -     PSA Screening; Future    Face lesion  -     [facility]) Dermatology, [place]        No follow-ups on file.     Terry De La Torre MD

## 2020-07-21 ENCOUNTER — TELEPHONE (OUTPATIENT)
Dept: PRIMARY CARE CLINIC | Age: 55
End: 2020-07-21

## 2020-07-21 NOTE — TELEPHONE ENCOUNTER
Patient was in to see you last Tuesday and he thinks you might have changed the dosage on his lisinopril to 40 mg and he is having issues.   Symptoms are dizziness, no energy, woozy and not feeling right, leave a message on machine if he is at work   Please advise

## 2020-09-28 ENCOUNTER — VIRTUAL VISIT (OUTPATIENT)
Dept: PRIMARY CARE CLINIC | Age: 55
End: 2020-09-28
Payer: COMMERCIAL

## 2020-09-28 ENCOUNTER — NURSE TRIAGE (OUTPATIENT)
Dept: OTHER | Facility: CLINIC | Age: 55
End: 2020-09-28

## 2020-09-28 DIAGNOSIS — R50.9 FEVER, UNSPECIFIED FEVER CAUSE: ICD-10-CM

## 2020-09-28 PROCEDURE — 99441 PR PHYS/QHP TELEPHONE EVALUATION 5-10 MIN: CPT | Performed by: FAMILY MEDICINE

## 2020-09-28 ASSESSMENT — ENCOUNTER SYMPTOMS
RHINORRHEA: 0
SINUS PRESSURE: 0
SHORTNESS OF BREATH: 0
NAUSEA: 1
SINUS PAIN: 0
COUGH: 0
CONSTIPATION: 0
SORE THROAT: 0
ABDOMINAL PAIN: 0
CHEST TIGHTNESS: 0
DIARRHEA: 0
VOMITING: 0
TROUBLE SWALLOWING: 0

## 2020-09-28 NOTE — TELEPHONE ENCOUNTER
Patient called [place] [facility]) to schedule appointment with red flag complaint; left message for Nurse Access for triage by Keven Butler. Pt calls to report symptoms of foggy headed feeling. Pt states he woke up this morning at 0500 and had a headache but noticed he was not feeling \"right\". Pt states the headache has dissipated after medication but the \"foggy headedness\" is still present. Reports feeling some dizziness as well. States he is having neck and back pain also. Denies weakness/numbness or feeling disoriented/confused. Informed of disposition. Care advice as documented. Instructed to call back with worsening symptoms. Soft transfer to [facility]) to schedule appointment as recommended. Attention Provider: Thank you for allowing me to participate in the care of your patient. The patient was connected to triage in response to information provided to the [facility]. Please do not respond through this encounter as the response is not directed to a shared pool. Reason for Disposition   Neck pain (with neurologic deficit)    Answer Assessment - Initial Assessment Questions  1. SYMPTOM: \"What is the main symptom you are concerned about? \" (e.g., weakness, numbness)      \"foggy headed\"  2. ONSET: \"When did this start? \" (minutes, hours, days; while sleeping)      0500 today  3. LAST NORMAL: \"When was the last time you were normal (no symptoms)? \"      Last night before falling asleep  4. PATTERN \"Does this come and go, or has it been constant since it started? \"  \"Is it present now? \"      constant  5. CARDIAC SYMPTOMS: \"Have you had any of the following symptoms: chest pain, difficulty breathing, palpitations? \"      No  6. NEUROLOGIC SYMPTOMS: \"Have you had any of the following symptoms: headache, dizziness, vision loss, double vision, changes in speech, unsteady on your feet? \"      Dizziness, and headache this morning  7. OTHER SYMPTOMS: \"Do you have any other symptoms? \"      No  8.  PREGNANCY: \"Is there any chance you are pregnant? \" \"When was your last menstrual period? \"      NA    Protocols used: NEUROLOGIC DEFICIT-ADULT-[place]

## 2020-09-28 NOTE — PROGRESS NOTES
2020    TELEHEALTH EVALUATION -- Audio/Visual (During FZYHT-40 public health emergency)    Due to COVID 19 outbreak, patient's office visit was converted to a virtual visit. Patient was contacted and agreed to proceed with a virtual visit via Telephone Visit. The risks and benefits of converting to a virtual visit were discussed in light of the current infectious disease epidemic. Patient also understood that insurance coverage and co-pays are up to their individual insurance plans. HPI:    Reza Dumont (:  1965) has requested an audio evaluation for the following concern(s):    Fever, chills, nausea. Patient requests a virtual visit for an acute visit. He says that since yesterday, he has had fever, chills, nausea. He works in retail. He is concerned about possible COVID. Review of Systems   Constitutional: Positive for chills. Negative for fever and unexpected weight change. HENT: Negative for congestion, postnasal drip, rhinorrhea, sinus pressure, sinus pain, sore throat, tinnitus and trouble swallowing. Eyes: Negative for visual disturbance. Respiratory: Negative for cough, chest tightness and shortness of breath. Cardiovascular: Negative for chest pain. Gastrointestinal: Positive for nausea. Negative for abdominal pain, constipation, diarrhea and vomiting. Skin: Negative for rash. Neurological: Positive for headaches. Negative for weakness. Psychiatric/Behavioral: Negative for suicidal ideas. Prior to Visit Medications    Medication Sig Taking?  Authorizing Provider   lisinopril (PRINIVIL;ZESTRIL) 40 MG tablet Take 1 tablet by mouth daily  [facility], [facility]   hydroCHLOROthiazide (HYDRODIURIL) 25 MG tablet Take 1 tablet by mouth daily as needed (PRN)  [facility], MD       Social History     Tobacco Use    Smoking status: Current Every Day Smoker     Packs/day: 0.50     Years: 35.00     Pack years: 17.50    Smokeless tobacco: Never Used   Substance Use Topics    Alcohol use: No     Alcohol/week: 0.0 standard drinks    Drug use: Not on file        PHYSICAL EXAMINATION:    Due to this being a TeleHealth encounter, evaluation of the organ systems is limited. ASSESSMENT/PLAN:  1. Fever, unspecified fever cause    2. Chills    3. Nausea    - COVID-19 Ambulatory; Future      Return if symptoms worsen or fail to improve. Time spent with patient: 5 minutes. An  electronic signature was used to authenticate this note. --Vernell Taylor DO on 9/28/2020 at 2:15 PM        Pursuant to the emergency declaration under the [facility], [facility] waiver authority and the [employer] Act, this Virtual  Visit was conducted, with patient's consent, to reduce the patient's risk of exposure to COVID-19 and provide continuity of care for an established patient. Services were provided through a telephone synchronous discussion virtually to substitute for in-person clinic visit.

## 2020-09-30 LAB
SARS-COV-2: NOT DETECTED
SOURCE: NORMAL

## 2020-10-01 ENCOUNTER — TELEPHONE (OUTPATIENT)
Dept: PRIMARY CARE CLINIC | Age: 55
End: 2020-10-01

## 2020-10-01 NOTE — TELEPHONE ENCOUNTER
Please provide patient with a note that states that he was tested for COVID, is negative, and may return to work. Thank you.

## 2020-10-01 NOTE — TELEPHONE ENCOUNTER
Spoke with patient in regards to negative COVID test and is coming in to  a copy of the [facility] lab results.

## 2020-10-01 NOTE — TELEPHONE ENCOUNTER
Patient calling in states he needs something for his employer stating that under your care and that he was tested for COVID.     Please advise

## 2020-10-02 NOTE — TELEPHONE ENCOUNTER
1st attempt to reach patient. Called patient @ [phone] and left message with his dad regarding his not to go back to work dad stated he was at work told dad to have him call us back and let us know if note was still needed.

## 2021-04-03 ENCOUNTER — HOSPITAL ENCOUNTER (OUTPATIENT)
Age: 56
Setting detail: OBSERVATION
Discharge: HOME | End: 2021-04-04
Attending: EMERGENCY MEDICINE | Admitting: INTERNAL MEDICINE

## 2021-04-03 DIAGNOSIS — F32.A DEPRESSION, UNSPECIFIED DEPRESSION TYPE: Primary | ICD-10-CM

## 2021-04-03 DIAGNOSIS — R07.9 CHEST PAIN, UNSPECIFIED TYPE: ICD-10-CM

## 2021-04-03 LAB
ALBUMIN SERPL-MCNC: 4 G/DL (ref 3.5–4.6)
ALP BLD-CCNC: 67 U/L (ref 35–104)
ALT SERPL-CCNC: 22 U/L (ref 0–41)
ANION GAP SERPL CALCULATED.3IONS-SCNC: 18 MEQ/L (ref 9–15)
AST SERPL-CCNC: 20 U/L (ref 0–40)
BASE EXCESS ARTERIAL: -3 (ref -3–3)
BASOPHILS ABSOLUTE: 0.1 K/UL (ref 0–0.2)
BASOPHILS RELATIVE PERCENT: 1 %
BILIRUB SERPL-MCNC: <0.2 MG/DL (ref 0.2–0.7)
BUN BLDV-MCNC: 11 MG/DL (ref 6–20)
CALCIUM IONIZED: 1.15 MMOL/L (ref 1.12–1.32)
CALCIUM SERPL-MCNC: 9.1 MG/DL (ref 8.5–9.9)
CHLORIDE BLD-SCNC: 100 MEQ/L (ref 95–107)
CO2: 19 MEQ/L (ref 20–31)
CREAT SERPL-MCNC: 1.32 MG/DL (ref 0.7–1.2)
D DIMER: <0.27 MG/L FEU (ref 0–0.5)
EKG ATRIAL RATE: 84 BPM
EKG P AXIS: 54 DEGREES
EKG P-R INTERVAL: 156 MS
EKG Q-T INTERVAL: 418 MS
EKG QRS DURATION: 92 MS
EKG QTC CALCULATION (BAZETT): 493 MS
EKG R AXIS: 22 DEGREES
EKG T AXIS: 17 DEGREES
EKG VENTRICULAR RATE: 84 BPM
EOSINOPHILS ABSOLUTE: 0.1 K/UL (ref 0–0.7)
EOSINOPHILS RELATIVE PERCENT: 1.8 %
GFR AFRICAN AMERICAN: >60
GFR AFRICAN AMERICAN: >60
GFR NON-AFRICAN AMERICAN: 52
GFR NON-AFRICAN AMERICAN: 56.1
GLOBULIN: 2.7 G/DL (ref 2.3–3.5)
GLUCOSE BLD-MCNC: 107 MG/DL (ref 60–115)
GLUCOSE BLD-MCNC: 112 MG/DL (ref 70–99)
HCO3 ARTERIAL: 22.7 MMOL/L (ref 21–29)
HCT VFR BLD CALC: 43.2 % (ref 42–52)
HEMOGLOBIN: 12.8 GM/DL (ref 13.5–17.5)
HEMOGLOBIN: 15 G/DL (ref 14–18)
LACTATE: 2.35 MMOL/L (ref 0.4–2)
LYMPHOCYTES ABSOLUTE: 1.7 K/UL (ref 1–4.8)
LYMPHOCYTES RELATIVE PERCENT: 25.1 %
MCH RBC QN AUTO: 36.4 PG (ref 27–31.3)
MCHC RBC AUTO-ENTMCNC: 34.8 % (ref 33–37)
MCV RBC AUTO: 104.5 FL (ref 80–100)
MONOCYTES ABSOLUTE: 0.7 K/UL (ref 0.2–0.8)
MONOCYTES RELATIVE PERCENT: 10.3 %
NEUTROPHILS ABSOLUTE: 4.3 K/UL (ref 1.4–6.5)
NEUTROPHILS RELATIVE PERCENT: 61.8 %
O2 SAT, ARTERIAL: 93 % (ref 93–100)
PCO2 ARTERIAL: 42 MM HG (ref 35–45)
PDW BLD-RTO: 13.6 % (ref 11.5–14.5)
PERFORMED ON: ABNORMAL
PH ARTERIAL: 7.34 (ref 7.35–7.45)
PLATELET # BLD: 336 K/UL (ref 130–400)
PO2 ARTERIAL: 73 MM HG (ref 75–108)
POC CHLORIDE: 106 MEQ/L (ref 99–110)
POC CREATININE: 1.4 MG/DL (ref 0.9–1.3)
POC FIO2: 2
POC HEMATOCRIT: 38 % (ref 41–53)
POC POTASSIUM: 3.3 MEQ/L (ref 3.5–5.1)
POC SAMPLE TYPE: ABNORMAL
POC SODIUM: 140 MEQ/L (ref 136–145)
POTASSIUM SERPL-SCNC: 3.7 MEQ/L (ref 3.4–4.9)
RBC # BLD: 4.14 M/UL (ref 4.7–6.1)
SARS-COV-2, NAAT: NOT DETECTED
SODIUM BLD-SCNC: 137 MEQ/L (ref 135–144)
TCO2 ARTERIAL: 24 (ref 22–29)
TOTAL PROTEIN: 6.7 G/DL (ref 6.3–8)
TROPONIN: <0.01 NG/ML (ref 0–0.01)
WBC # BLD: 7 K/UL (ref 4.8–10.8)

## 2021-04-03 PROCEDURE — 96360 HYDRATION IV INFUSION INIT: CPT

## 2021-04-03 PROCEDURE — 85025 COMPLETE CBC W/AUTO DIFF WBC: CPT

## 2021-04-03 PROCEDURE — 87635 SARS-COV-2 COVID-19 AMP PRB: CPT

## 2021-04-03 PROCEDURE — 84295 ASSAY OF SERUM SODIUM: CPT

## 2021-04-03 PROCEDURE — 83605 ASSAY OF LACTIC ACID: CPT

## 2021-04-03 PROCEDURE — 84484 ASSAY OF TROPONIN QUANT: CPT

## 2021-04-03 PROCEDURE — 96361 HYDRATE IV INFUSION ADD-ON: CPT

## 2021-04-03 PROCEDURE — 82330 ASSAY OF CALCIUM: CPT

## 2021-04-03 PROCEDURE — 82435 ASSAY OF BLOOD CHLORIDE: CPT

## 2021-04-03 PROCEDURE — 84132 ASSAY OF SERUM POTASSIUM: CPT

## 2021-04-03 PROCEDURE — 99284 EMERGENCY DEPT VISIT MOD MDM: CPT

## 2021-04-03 PROCEDURE — 82565 ASSAY OF CREATININE: CPT

## 2021-04-03 PROCEDURE — 93005 ELECTROCARDIOGRAM TRACING: CPT | Performed by: EMERGENCY MEDICINE

## 2021-04-03 PROCEDURE — 85014 HEMATOCRIT: CPT

## 2021-04-03 PROCEDURE — 82803 BLOOD GASES ANY COMBINATION: CPT

## 2021-04-03 PROCEDURE — 85379 FIBRIN DEGRADATION QUANT: CPT

## 2021-04-03 PROCEDURE — 2580000003 HC RX 258: Performed by: EMERGENCY MEDICINE

## 2021-04-03 PROCEDURE — 36415 COLL VENOUS BLD VENIPUNCTURE: CPT

## 2021-04-03 PROCEDURE — 80053 COMPREHEN METABOLIC PANEL: CPT

## 2021-04-03 PROCEDURE — 6370000000 HC RX 637 (ALT 250 FOR IP): Performed by: EMERGENCY MEDICINE

## 2021-04-03 PROCEDURE — 36600 WITHDRAWAL OF ARTERIAL BLOOD: CPT

## 2021-04-03 RX ORDER — ASPIRIN 81 MG/1
324 TABLET, CHEWABLE ORAL ONCE
Status: COMPLETED | OUTPATIENT
Start: 2021-04-03 | End: 2021-04-03

## 2021-04-03 RX ORDER — 0.9 % SODIUM CHLORIDE 0.9 %
1000 INTRAVENOUS SOLUTION INTRAVENOUS ONCE
Status: COMPLETED | OUTPATIENT
Start: 2021-04-03 | End: 2021-04-04

## 2021-04-03 RX ADMIN — SODIUM CHLORIDE 1000 ML: 9 INJECTION, SOLUTION INTRAVENOUS at 22:45

## 2021-04-03 RX ADMIN — ASPIRIN 324 MG: 81 TABLET, CHEWABLE ORAL at 23:06

## 2021-04-03 ASSESSMENT — PAIN DESCRIPTION - LOCATION: LOCATION: CHEST

## 2021-04-03 ASSESSMENT — PAIN SCALES - GENERAL: PAINLEVEL_OUTOF10: 7

## 2021-04-03 ASSESSMENT — PAIN DESCRIPTION - FREQUENCY: FREQUENCY: CONTINUOUS

## 2021-04-04 ENCOUNTER — APPOINTMENT (OUTPATIENT)
Dept: GENERAL RADIOLOGY | Age: 56
End: 2021-04-04

## 2021-04-04 VITALS
BODY MASS INDEX: 27.92 KG/M2 | WEIGHT: 177.91 LBS | DIASTOLIC BLOOD PRESSURE: 69 MMHG | OXYGEN SATURATION: 98 % | HEIGHT: 67 IN | HEART RATE: 68 BPM | SYSTOLIC BLOOD PRESSURE: 127 MMHG | RESPIRATION RATE: 17 BRPM | TEMPERATURE: 97.9 F

## 2021-04-04 PROBLEM — I20.89 ANGINA AT REST: Status: ACTIVE | Noted: 2021-04-04

## 2021-04-04 PROBLEM — R07.9 CHEST PAIN: Status: ACTIVE | Noted: 2021-04-04

## 2021-04-04 PROBLEM — I20.8 ANGINA AT REST (HCC): Status: ACTIVE | Noted: 2021-04-04

## 2021-04-04 LAB — TROPONIN: <0.01 NG/ML (ref 0–0.01)

## 2021-04-04 PROCEDURE — 71045 X-RAY EXAM CHEST 1 VIEW: CPT

## 2021-04-04 PROCEDURE — G0378 HOSPITAL OBSERVATION PER HR: HCPCS

## 2021-04-04 PROCEDURE — 84484 ASSAY OF TROPONIN QUANT: CPT

## 2021-04-04 PROCEDURE — 2580000003 HC RX 258: Performed by: INTERNAL MEDICINE

## 2021-04-04 PROCEDURE — 99205 OFFICE O/P NEW HI 60 MIN: CPT | Performed by: INTERNAL MEDICINE

## 2021-04-04 PROCEDURE — 36415 COLL VENOUS BLD VENIPUNCTURE: CPT

## 2021-04-04 PROCEDURE — 6370000000 HC RX 637 (ALT 250 FOR IP): Performed by: INTERNAL MEDICINE

## 2021-04-04 RX ORDER — ACETAMINOPHEN 325 MG/1
650 TABLET ORAL EVERY 6 HOURS PRN
Status: DISCONTINUED | OUTPATIENT
Start: 2021-04-04 | End: 2021-04-04 | Stop reason: HOSPADM

## 2021-04-04 RX ORDER — POLYETHYLENE GLYCOL 3350 17 G/17G
17 POWDER, FOR SOLUTION ORAL DAILY PRN
Status: DISCONTINUED | OUTPATIENT
Start: 2021-04-04 | End: 2021-04-04 | Stop reason: HOSPADM

## 2021-04-04 RX ORDER — PROMETHAZINE HYDROCHLORIDE 12.5 MG/1
12.5 TABLET ORAL EVERY 6 HOURS PRN
Status: DISCONTINUED | OUTPATIENT
Start: 2021-04-04 | End: 2021-04-04 | Stop reason: HOSPADM

## 2021-04-04 RX ORDER — ASPIRIN 81 MG/1
81 TABLET, CHEWABLE ORAL DAILY
Status: DISCONTINUED | OUTPATIENT
Start: 2021-04-04 | End: 2021-04-04 | Stop reason: HOSPADM

## 2021-04-04 RX ORDER — SODIUM CHLORIDE 9 MG/ML
25 INJECTION, SOLUTION INTRAVENOUS PRN
Status: DISCONTINUED | OUTPATIENT
Start: 2021-04-04 | End: 2021-04-04 | Stop reason: HOSPADM

## 2021-04-04 RX ORDER — PANTOPRAZOLE SODIUM 40 MG/1
40 TABLET, DELAYED RELEASE ORAL
Status: DISCONTINUED | OUTPATIENT
Start: 2021-04-05 | End: 2021-04-04 | Stop reason: HOSPADM

## 2021-04-04 RX ORDER — ESOMEPRAZOLE MAGNESIUM 20 MG/1
20 FOR SUSPENSION ORAL DAILY
Status: DISCONTINUED | OUTPATIENT
Start: 2021-04-04 | End: 2021-04-04 | Stop reason: CLARIF

## 2021-04-04 RX ORDER — ACETAMINOPHEN 650 MG/1
650 SUPPOSITORY RECTAL EVERY 6 HOURS PRN
Status: DISCONTINUED | OUTPATIENT
Start: 2021-04-04 | End: 2021-04-04 | Stop reason: HOSPADM

## 2021-04-04 RX ORDER — ASPIRIN 81 MG/1
81 TABLET, CHEWABLE ORAL DAILY
Qty: 30 TABLET | Refills: 3 | Status: SHIPPED | OUTPATIENT
Start: 2021-04-05 | End: 2022-05-09

## 2021-04-04 RX ORDER — SODIUM CHLORIDE 0.9 % (FLUSH) 0.9 %
10 SYRINGE (ML) INJECTION EVERY 12 HOURS SCHEDULED
Status: DISCONTINUED | OUTPATIENT
Start: 2021-04-04 | End: 2021-04-04 | Stop reason: HOSPADM

## 2021-04-04 RX ORDER — HYDROCHLOROTHIAZIDE 25 MG/1
25 TABLET ORAL DAILY PRN
Status: DISCONTINUED | OUTPATIENT
Start: 2021-04-04 | End: 2021-04-04 | Stop reason: HOSPADM

## 2021-04-04 RX ORDER — SODIUM CHLORIDE 0.9 % (FLUSH) 0.9 %
10 SYRINGE (ML) INJECTION PRN
Status: DISCONTINUED | OUTPATIENT
Start: 2021-04-04 | End: 2021-04-04 | Stop reason: HOSPADM

## 2021-04-04 RX ORDER — ONDANSETRON 2 MG/ML
4 INJECTION INTRAMUSCULAR; INTRAVENOUS EVERY 6 HOURS PRN
Status: DISCONTINUED | OUTPATIENT
Start: 2021-04-04 | End: 2021-04-04 | Stop reason: HOSPADM

## 2021-04-04 RX ORDER — LISINOPRIL 20 MG/1
40 TABLET ORAL DAILY
Status: DISCONTINUED | OUTPATIENT
Start: 2021-04-04 | End: 2021-04-04 | Stop reason: HOSPADM

## 2021-04-04 RX ADMIN — ASPIRIN 81 MG: 81 TABLET, CHEWABLE ORAL at 09:18

## 2021-04-04 RX ADMIN — SODIUM CHLORIDE, PRESERVATIVE FREE 10 ML: 5 INJECTION INTRAVENOUS at 09:19

## 2021-04-04 ASSESSMENT — ENCOUNTER SYMPTOMS
VOICE CHANGE: 0
TROUBLE SWALLOWING: 0
FACIAL SWELLING: 0
CHEST TIGHTNESS: 0
ANAL BLEEDING: 0
SHORTNESS OF BREATH: 0
APNEA: 0
BLOOD IN STOOL: 0
COLOR CHANGE: 0
VOMITING: 0
ABDOMINAL DISTENTION: 0
NAUSEA: 0
WHEEZING: 0

## 2021-04-04 NOTE — FLOWSHEET NOTE
Arrived to floor, sleepy but awake enough to answer the admission assessment questions. V/s stable, maintained at 2L, sats stable. Pt denies taking any prescription meds except nexium. Denies any c/o at this time. Needs attended.

## 2021-04-04 NOTE — ED PROVIDER NOTES
[facility] ED  EMERGENCY DEPARTMENT ENCOUNTER      Pt Name: Roger Hunt  MRN: [id number]  [place] 1965  Date of evaluation: 4/3/2021  Provider: Law Ceja MD    CHIEF COMPLAINT       Chief Complaint   Patient presents with    Chest Pain         HISTORY OF PRESENT ILLNESS   (Location/Symptom, Timing/Onset, Context/Setting, Quality, Duration, Modifying Factors, Severity)  Note limiting factors. 26-year-old male presenting with substernal chest pressure. Symptoms started several hours prior to his arrival.  It is constant and nonradiating. Not made better or worse by anything in particular. It is not associated with exertion. No reported cardiac history. EMS reports that patient was pale and diaphoretic on their arrival.  Family gave patient both a BP med and nitro at home prior to his arrival here. On arrival patient is alert and oriented. Nursing Notes were reviewed. REVIEW OF SYSTEMS    (2-9 systems for level 4, 10 or more for level 5)     Review of Systems   Cardiovascular: Positive for chest pain. All other systems reviewed and are negative. Except as noted above the remainder of the review of systems was reviewed and negative.        PAST MEDICAL HISTORY     Past Medical History:   Diagnosis Date    Alcohol abuse, in remission     Backache, unspecified     Chronic gastric ulcer without mention of hemorrhage or perforation, with obstruction     Decreased libido     Depressive disorder, not elsewhere classified     Generalized anxiety disorder     Other malaise and fatigue     Reflux esophagitis     Unspecified essential hypertension     Uric acid nephrolithiasis          SURGICAL HISTORY       Past Surgical History:   Procedure Laterality Date    ANTERIOR CRUCIATE LIGAMENT REPAIR      BACK SURGERY      COLONOSCOPY  2012    LITHOTRIPSY           CURRENT MEDICATIONS       Previous Medications    HYDROCHLOROTHIAZIDE (HYDRODIURIL) 25 MG TABLET    Take 1 tablet by mouth daily as needed (PRN)    LISINOPRIL (PRINIVIL;ZESTRIL) 40 MG TABLET    Take 1 tablet by mouth daily       ALLERGIES     Patient has no known allergies. FAMILY HISTORY     History reviewed. No pertinent family history.        SOCIAL HISTORY       Social History     Socioeconomic History    Marital status: Single     Spouse name: None    Number of children: None    Years of education: None    Highest education level: None   Occupational History    None   Social Needs    Financial resource strain: Not hard at all   [employer] insecurity     Worry: Never true     Inability: Never true   [employer] needs     Medical: None     Non-medical: None   Tobacco Use    Smoking status: Current Every Day Smoker     Packs/day: 0.50     Years: 35.00     Pack years: 17.50    Smokeless tobacco: Never Used   Substance and Sexual Activity    Alcohol use: No     Alcohol/week: 0.0 standard drinks    Drug use: None    Sexual activity: None   Lifestyle    Physical activity     Days per week: None     Minutes per session: None    Stress: None   Relationships    Social connections     Talks on phone: None     Gets together: None     Attends Christianity service: None     Active member of club or organization: None     Attends meetings of clubs or organizations: None     Relationship status: None    Intimate partner violence     Fear of current or ex partner: None     Emotionally abused: None     Physically abused: None     Forced sexual activity: None   Other Topics Concern    None   Social History Narrative    None       SCREENINGS               PHYSICAL EXAM    (up to 7 for level 4, 8 or more for level 5)     ED Triage Vitals   BP Temp Temp Source Pulse Resp SpO2 Height Weight   04/03/21 2256 04/03/21 2242 04/03/21 2242 04/03/21 2256 04/03/21 2256 04/03/21 2256 04/03/21 2256 04/03/21 2256   (!) 81/56 98.2 °F (36.8 °C) Oral 87 20 96 % 5' 7\" (1.702 m) 180 lb (81.6 kg)       Physical Exam  Vitals signs and nursing note reviewed. Constitutional:       General: He is not in acute distress. Appearance: Normal appearance. He is well-developed. He is not toxic-appearing or diaphoretic. HENT:      Head: Normocephalic and atraumatic. Mouth/Throat:      Mouth: Mucous membranes are moist.      Pharynx: Oropharynx is clear. Eyes:      Extraocular Movements: Extraocular movements intact. Conjunctiva/sclera: Conjunctivae normal.   Neck:      Musculoskeletal: Normal range of motion and neck supple. Cardiovascular:      Rate and Rhythm: Normal rate and regular rhythm. Pulmonary:      Effort: Pulmonary effort is normal.      Breath sounds: Normal breath sounds. Abdominal:      General: Bowel sounds are normal.      Palpations: Abdomen is soft. Tenderness: There is no abdominal tenderness. Musculoskeletal: Normal range of motion. General: No deformity. Skin:     General: Skin is warm and dry. Capillary Refill: Capillary refill takes less than 2 seconds. Neurological:      General: No focal deficit present. Mental Status: He is alert and oriented to person, place, and time. Mental status is at baseline. Cranial Nerves: No cranial nerve deficit. Psychiatric:         Thought Content:  Thought content normal.         DIAGNOSTIC RESULTS     EKG: All EKG's are interpreted by the Emergency Department Physician who either signs or Co-signs this chart in the absence of a cardiologist.    NSR, rate 84, normal intervals, no ST elevation/ depression    RADIOLOGY:   Non-plain film images such as CT, Ultrasound and MRI are read by the radiologist. Plain radiographic images are visualized and preliminarily interpreted by the emergency physician with the below findings:    CXR- neg acute    Interpretation per the Radiologist below, if available at the time of this note:    XR CHEST PORTABLE    (Results Pending)       LABS:  Labs Reviewed   COMPREHENSIVE METABOLIC PANEL - Abnormal; Notable for the following components:       Result Value    CO2 19 (*)     Anion Gap 18 (*)     Glucose 112 (*)     CREATININE 1.32 (*)     GFR Non- 56.1 (*)     All other components within normal limits   CBC WITH AUTO DIFFERENTIAL - Abnormal; Notable for the following components:    RBC 4.14 (*)     .5 (*)     MCH 36.4 (*)     All other components within normal limits   POCT ARTERIAL - Abnormal; Notable for the following components:    POC Potassium 3.3 (*)     POC Creatinine 1.4 (*)     GFR Non-African American 52 (*)     pH, Arterial 7.339 (*)     pO2, Arterial 73 (*)     O2 Sat, Arterial 93 (*)     Lactate 2.35 (*)     POC Hematocrit 38 (*)     Hemoglobin 12.8 (*)     All other components within normal limits   COVID-19, RAPID   TROPONIN   D-DIMER, QUANTITATIVE   POCT EPOC BLOOD GAS, LACTIC ACID, ICA       All other labs were within normal range or not returned as of this dictation. EMERGENCY DEPARTMENT COURSE and DIFFERENTIAL DIAGNOSIS/MDM:   Vitals:    Vitals:    04/03/21 2256 04/03/21 2300 04/03/21 2330 04/04/21 0000   BP: (!) 81/56 (!) 85/59 95/71 115/89   Pulse: 87 79 88 86   Resp: 20 18 14 17   Temp: 98.2 °F (36.8 °C)      TempSrc: Oral      SpO2: 96% 94% 94% 95%   Weight: 180 lb (81.6 kg)      Height: 5' 7\" (1.702 m)          MDM  Number of Diagnoses or Management Options  Chest pain, unspecified type  Diagnosis management comments: 42-year-old male presenting with chest pain. EMS reports that patient appeared to be in visible distress and was diaphoretic. He appears well here. Does not have a cardiologist.  Given the story I will admit for ACS rule out. Spoke to Dr. Yeison Horvath who accepts patient. Initially hypotensive and this resolved with fluids. Admitted to the floor in fair condition. Given ASA in ED. Procedures    CRITICAL CARE TIME   Total Critical Care time was 0 minutes, excluding separately reportable procedures.   There was a high probability of clinically

## 2021-04-04 NOTE — FLOWSHEET NOTE
Dr Ad Rivera in to see patient. Patient does not want to have any cardiology work up done(stress test or cath). Plan for discharge to home today.  Electronically signed by Chong Corcoran RN on 4/4/2021 at 11:22 AM

## 2021-04-04 NOTE — H&P
History and Physical  Patient: Sarah Yusuf  Unit/Bed:W178/W178-01  YOB: 1965  MRN: [id number]  Acct: [de-identified]   Admitting Diagnosis: Chest pain [R07.9]  Angina at rest [facility]) [I20.8]  Admit Date:  4/3/2021  Hospital Day: 0      Chief Complaint:   Chest pain    History of Present Illness:  49-year-old white male who is unemployed currently seen in the ER with substernal chest pressure. Very suspicious for angina. It goes up to the side of the neck. Nothing makes it worse. No fever or chills.   No reported cardiac history except for hypertension for which she has been given some medication but he says when he takes his blood pressure pills the blood pressure goes too low that is what he has not been taking it today he took it her blood pressure went low that is why he came to the ER    No Known Allergies    Current Facility-Administered Medications   Medication Dose Route Frequency Provider Last Rate Last Admin    sodium chloride flush 0.9 % injection 10 mL  10 mL Intravenous 2 times per day Timbo Guerra MD   10 mL at 04/04/21 0919    sodium chloride flush 0.9 % injection 10 mL  10 mL Intravenous PRN Timbo Guerra MD        0.9 % sodium chloride infusion  25 mL Intravenous PRN Timbo Guerra MD        enoxaparin (LOVENOX) injection 40 mg  40 mg Subcutaneous Daily Timbo Guerra MD        promethazine (PHENERGAN) tablet 12.5 mg  12.5 mg Oral Q6H PRN Timbo Guerra MD        Or    ondansetron [facility]) injection 4 mg  4 mg Intravenous Q6H PRN Timbo uGerra MD        acetaminophen (TYLENOL) tablet 650 mg  650 mg Oral Q6H PRN Timbo Guerra MD        Or    acetaminophen (TYLENOL) suppository 650 mg  650 mg Rectal Q6H PRN Timbo Guerra MD        polyethylene glycol [facility]) packet 17 g  17 g Oral Daily PRN Timbo Guerra MD        aspirin chewable tablet 81 mg  81 mg Oral Daily Timbo Guerra MD   81 mg at 04/04/21 0918    esomeprazole Magnesium (NEXIUM) 20 MG PACK 20 mg  20 mg Oral Daily Bonilla Serna MD        hydroCHLOROthiazide (HYDRODIURIL) tablet 25 mg  25 mg Oral Daily PRN Bonilla Serna MD        lisinopril (PRINIVIL;ZESTRIL) tablet 40 mg  40 mg Oral Daily Bonilla Serna MD           PMHx:  Past Medical History:   Diagnosis Date    Alcohol abuse, in remission     Backache, unspecified     Chronic gastric ulcer without mention of hemorrhage or perforation, with obstruction     Decreased libido     Depressive disorder, not elsewhere classified     Generalized anxiety disorder     Other malaise and fatigue     Reflux esophagitis     Unspecified essential hypertension     Uric acid nephrolithiasis        PSHx:  Past Surgical History:   Procedure Laterality Date    ANTERIOR CRUCIATE LIGAMENT REPAIR      BACK SURGERY      COLONOSCOPY  2012    LITHOTRIPSY         Social Hx:  Social History     Socioeconomic History    Marital status: Single     Spouse name: None    Number of children: None    Years of education: None    Highest education level: None   Occupational History    None   Social Needs    Financial resource strain: Not hard at all   [employer] insecurity     Worry: Never true     Inability: Never true   [employer] needs     Medical: None     Non-medical: None   Tobacco Use    Smoking status: Current Every Day Smoker     Packs/day: 0.50     Years: 35.00     Pack years: 17.50    Smokeless tobacco: Never Used   Substance and Sexual Activity    Alcohol use: No     Alcohol/week: 0.0 standard drinks    Drug use: None    Sexual activity: None   Lifestyle    Physical activity     Days per week: None     Minutes per session: None    Stress: None   Relationships    Social connections     Talks on phone: None     Gets together: None     Attends Advent service: None     Active member of club or organization: None     Attends meetings of clubs or organizations: None     Relationship status: None    Intimate partner violence     Fear of current or ex partner: None     Emotionally abused: None     Physically abused: None     Forced sexual activity: None   Other Topics Concern    None   Social History Narrative    None       Family Hx:  History reviewed. No pertinent family history. Review ofSystems:   Review of Systems   Constitutional: Negative for activity change, appetite change, diaphoresis, fatigue and unexpected weight change. HENT: Negative for facial swelling, nosebleeds, trouble swallowing and voice change. Respiratory: Negative for apnea, chest tightness, shortness of breath and wheezing. Cardiovascular: Negative for chest pain, palpitations and leg swelling. Gastrointestinal: Negative for abdominal distention, anal bleeding, blood in stool, nausea and vomiting. Genitourinary: Negative for decreased urine volume and dysuria. Musculoskeletal: Negative for gait problem and myalgias. Skin: Negative for color change, pallor, rash and wound. Neurological: Negative for dizziness, syncope, facial asymmetry, weakness, light-headedness, numbness and headaches. Hematological: Does not bruise/bleed easily. Psychiatric/Behavioral: Negative for agitation, behavioral problems, confusion, hallucinations and suicidal ideas. The patient is not nervous/anxious. All other systems reviewed and are negative. Physical Examination:    /69   Pulse 68   Temp 97.9 °F (36.6 °C) (Oral)   Resp 17   Ht 5' 7\" (1.702 m)   Wt 177 lb 14.6 oz (80.7 kg)   SpO2 98%   BMI 27.86 kg/m²    Physical Exam  Constitutional:       Appearance: He is well-developed. HENT:      Head: Normocephalic and atraumatic. Right Ear: External ear normal.      Left Ear: External ear normal.   Eyes:      Conjunctiva/sclera: Conjunctivae normal.      Pupils: Pupils are equal, round, and reactive to light. Neck:      Musculoskeletal: Normal range of motion and neck supple. Cardiovascular:      Rate and Rhythm: Normal rate and regular rhythm.       Heart sounds: Normal heart sounds. Pulmonary:      Effort: Pulmonary effort is normal.      Breath sounds: Normal breath sounds. Abdominal:      General: Bowel sounds are normal.      Palpations: Abdomen is soft. Musculoskeletal: Normal range of motion. Skin:     General: Skin is warm and dry. Neurological:      Mental Status: He is alert and oriented to person, place, and time. Deep Tendon Reflexes: Reflexes are normal and symmetric. Psychiatric:         Behavior: Behavior normal.         Thought Content:  Thought content normal.         Judgment: Judgment normal.          LABS:  CBC:   Lab Results   Component Value Date    WBC 7.0 04/03/2021    RBC 4.14 04/03/2021    HGB 12.8 04/03/2021    HCT 43.2 04/03/2021    .5 04/03/2021    MCH 36.4 04/03/2021    MCHC 34.8 04/03/2021    RDW 13.6 04/03/2021     04/03/2021    MPV 7.5 01/31/2016     CBC with Differential:   Lab Results   Component Value Date    WBC 7.0 04/03/2021    RBC 4.14 04/03/2021    HGB 12.8 04/03/2021    HCT 43.2 04/03/2021     04/03/2021    .5 04/03/2021    MCH 36.4 04/03/2021    MCHC 34.8 04/03/2021    RDW 13.6 04/03/2021    SEGSPCT 56.6% 01/31/2016    LYMPHOPCT 25.1 04/03/2021    MONOPCT 10.3 04/03/2021    BASOPCT 1.0 04/03/2021    MONOSABS 0.7 04/03/2021    LYMPHSABS 1.7 04/03/2021    EOSABS 0.1 04/03/2021    BASOSABS 0.1 04/03/2021     CMP:    Lab Results   Component Value Date     04/03/2021    K 3.7 04/03/2021     04/03/2021    CO2 19 04/03/2021    BUN 11 04/03/2021    CREATININE 1.4 04/03/2021    CREATININE 1.32 04/03/2021    GFRAA >60 04/03/2021    AGRATIO 0.9 09/30/2015    LABGLOM 52 04/03/2021    GLUCOSE 112 04/03/2021    PROT 6.7 04/03/2021    LABALBU 4.0 04/03/2021    CALCIUM 9.1 04/03/2021    BILITOT <0.2 04/03/2021    ALKPHOS 67 04/03/2021    AST 20 04/03/2021    ALT 22 04/03/2021     BMP:    Lab Results   Component Value Date     04/03/2021    K 3.7 04/03/2021     04/03/2021 CO2 19 04/03/2021    BUN 11 04/03/2021    LABALBU 4.0 04/03/2021    CREATININE 1.4 04/03/2021    CREATININE 1.32 04/03/2021    CALCIUM 9.1 04/03/2021    GFRAA >60 04/03/2021    LABGLOM 52 04/03/2021    GLUCOSE 112 04/03/2021     Magnesium:  No results found for: MG  Troponin:    Lab Results   Component Value Date    TROPONINI <0.010 04/04/2021     No results for input(s): PROBNP in the last 72 hours. No results for input(s): INR in the last 72 hours. RADIOLOGY:  No results found. EKG:   Assessment:    Active Hospital Problems    Diagnosis Date Noted    Chest pain [R07.9] 04/04/2021    Angina at rest [facility]) [I20.8] 04/04/2021        Noncompliant       Hypertension       Smoker    Plan:  1. Long discussion with the patient regarding the need for stress test.  He said he had a stress test in [facility] couple of years ago which was negative. Because of suspicious symptoms, I suggested cardiac catheterization he does not want to do what he wants to go home. He is not going to be taking his blood pressure medications he says because he always gets him in trouble. I will advise him not to take the hydrochlorothiazide and reduce the ACE inhibitor to 20 mg a day. I am concerned about his clinical status. He says he will follow as outpatient which I doubt I will get another troponin now  2.          Electronically signed by Brian Aguirre MD on 4/4/2021 at 9:57 AM

## 2021-04-04 NOTE — DISCHARGE INSTR - DIET

## 2021-04-04 NOTE — DISCHARGE INSTR - COC
Continuity of Care Form    Patient Name: Jeanine Cade   :  1965  MRN:  [id number]    Admit date:  4/3/2021  Discharge date:  ***    Code Status Order: Full Code   Advance Directives:     Admitting Physician:  Hardeep Hernández MD  PCP: Aron Guevara MD    Discharging Nurse: [facility] Unit/Room#: R716/M778-24  Discharging Unit Phone Number: ***    Emergency Contact:   Extended Emergency Contact Information  Primary Emergency Contact: Bentley Carvajal of [address] [place] [address] Phone: [phone]  Mobile Phone: [phone]  Relation: Parent    Past Surgical History:  Past Surgical History:   Procedure Laterality Date    ANTERIOR CRUCIATE LIGAMENT REPAIR      BACK SURGERY      COLONOSCOPY  2012    LITHOTRIPSY         Immunization History: There is no immunization history on file for this patient.     Active Problems:  Patient Active Problem List   Diagnosis Code    Kidney stones N20.0    Avascular necrosis of bone ([address] M87.00    Effusion of joint M25.40    Acute medial meniscal tear S83.249A    ADHD F90.9    Anxiety F41.9    Depression F32.9    Chest pain R07.9    Angina at rest [facility]) I20.8       Isolation/Infection:   Isolation          No Isolation        Patient Infection Status     Infection Onset Added Last Indicated Last Indicated By Review Planned Expiration Resolved Resolved By    None active    Resolved    COVID-19 Rule Out 20 COVID-19 Ambulatory (Ordered)   20 Rule-Out Test Resulted          Nurse Assessment:  Last Vital Signs: /69   Pulse 68   Temp 97.9 °F (36.6 °C) (Oral)   Resp 17   Ht 5' 7\" (1.702 m)   Wt 177 lb 14.6 oz (80.7 kg)   SpO2 98%   BMI 27.86 kg/m²     Last documented pain score (0-10 scale): Pain Level: 0  Last Weight:   Wt Readings from Last 1 Encounters:   21 177 lb 14.6 oz (80.7 kg)     Mental Status:  {IP PT MENTAL STATUS:}    IV Access:  [facility] IV ACCESS:074697378}    Nursing Mobility/ADLs:  Walking   {CHP DME DSPT:947640708}  Transfer  {CHP DME VHTS:862248023}  Bathing  {CHP DME KSYB:859768177}  Dressing  {CHP DME YRNM:672635505}  Toileting  {CHP DME GRZI:176428117}  Feeding  {CHP DME FQJS:011251383}  Med Admin  {P DME MESN:797699364}  Med Delivery   { KEYLA MED Delivery:439440669}    Wound Care Documentation and Therapy:        Elimination:  Continence:   · Bowel: {YES / LE:11845}  · Bladder: {YES / GT:28965}  Urinary Catheter: {Urinary Catheter:056419638}   Colostomy/Ileostomy/Ileal Conduit: {YES / DF:90758}       Date of Last BM: ***    Intake/Output Summary (Last 24 hours) at 2021 1130  Last data filed at 2021 0636  Gross per 24 hour   Intake --   Output 700 ml   Net -700 ml     I/O last 3 completed shifts:  In: -   Out: 700 [Urine:700]    Safety Concerns:     [address] Safety Concerns:076457194}    Impairments/Disabilities:      [address] Impairments/Disabilities:996332731}    Nutrition Therapy:  Current Nutrition Therapy:   [address] Diet List:289336762}    Routes of Feeding: {[facility] DME Other Feedings:806753394}  Liquids: {Slp liquid thickness:58948}  Daily Fluid Restriction: {P DME Yes amt example:857012828}  Last Modified Barium Swallow with Video (Video Swallowing Test): {Done Not Done DIOM:613479467}    Treatments at the Time of Hospital Discharge:   Respiratory Treatments: ***  Oxygen Therapy:  {Therapy; copd oxygen:39717}  Ventilator:    { CC Vent WCLF:889058788}    Rehab Therapies: {THERAPEUTIC INTERVENTION:8457338965}  Weight Bearing Status/Restrictions: [address] [employer] Weight Bearin}  Other Medical Equipment (for information only, NOT a DME order):  {EQUIPMENT:231182159}  Other Treatments: ***    Patient's personal belongings (please select all that are sent with patient):  {[facility] DME Belongings:836555079}    RN SIGNATURE:  {Esignature:190850052}    CASE MANAGEMENT/SOCIAL WORK SECTION    Inpatient Status Date: ***    Readmission Risk Assessment Score:  Readmission Risk              Risk of Unplanned Readmission:        0           Discharging to Facility/ Agency   · Name:   · Address:  · Phone:  · Fax:    Dialysis Facility (if applicable)   · Name:  · Address:  · Dialysis Schedule:  · Phone:  · Fax:    / signature: {Esignature:807757482}    PHYSICIAN SECTION    Prognosis: {Prognosis:0519847285}    Condition at Discharge: Hortensia8 Aline Nuñez Patient Condition:831758927}    Rehab Potential (if transferring to Rehab): {Prognosis:9084749478}    Recommended Labs or Other Treatments After Discharge: ***    Physician Certification: I certify the above information and transfer of Mary Carrera  is necessary for the continuing treatment of the diagnosis listed and that he requires {Admit to Appropriate Level of Care:39754} for {GREATER/LESS:523614830} 30 days.      Update Admission H&P: {CHP DME Changes in JLRRT:216860516}    PHYSICIAN SIGNATURE:  {Esignature:466801556}

## 2021-04-04 NOTE — FLOWSHEET NOTE
Reviewed discharge instructions with patient. Discussed medication changes and follow up appointments. All questions answered. Taken to main entrance via wheelchair for discharge to home.  Electronically signed by Harpreet Marie RN on 4/4/2021 at 12:13 PM

## 2021-04-04 NOTE — FLOWSHEET NOTE
AM assessment completed. Patient resting in bed at this time. Denies any chest pain at this time. Remains NSR on the monitor. No edema noted. Pedal pulses palpable. Denies any shortness of breath. Lungs are clear but diminished bilaterally. SATS 98% on RA. He is A/Ox3 and can be up independently in the room. Denies any pain with elimination. Skin remains warm, dry and intact. #18g SL to left hand, flushed and patent. Vital signs stable and AM medications provided. Refused Lovenox injection. Call light remains in reach.  Electronically signed by Karoline Viramontes RN on 4/4/2021 at 11:19 AM

## 2021-04-05 ENCOUNTER — TELEPHONE (OUTPATIENT)
Dept: PRIMARY CARE CLINIC | Age: 56
End: 2021-04-05

## 2021-04-05 PROCEDURE — 93010 ELECTROCARDIOGRAM REPORT: CPT | Performed by: INTERNAL MEDICINE

## 2021-04-05 NOTE — TELEPHONE ENCOUNTER
[facility] Transitions Initial Follow Up Call    Outreach made within 2 business days of discharge: Yes    Patient: Harley Crump Patient : 1965   MRN: [id number]  Reason for Admission: There are no discharge diagnoses documented for the most recent discharge. Discharge Date: 21       Spoke with: PATIENT - DID STATE HE JUST LOST HIS JOB AND MAY BE CALLING BACK TO CANCEL PCP FOLLOW-UP    Discharge department/facility: [facility] Interactive Patient Contact:  Was patient able to fill all prescriptions: Yes  Was patient instructed to bring all medications to the follow-up visit: Yes  Is patient taking all medications as directed in the discharge summary?  Yes  Does patient understand their discharge instructions: Yes  Does patient have questions or concerns that need addressed prior to 7-14 day follow up office visit: no    Scheduled appointment with PCP within 7-14 days PATIENT AWARE OF BELOW PCP FOLLOW-UP BUT UNDECIDED IF HE WILL NEED TO CANCEL, WILL CALL BACK IF NEEDED AS HE JUST BECAME SELF PAY     Follow Up  Future Appointments   Date Time Provider Sharda Mantilla   2021  8:30 AM Ricky Abraham MD [address]

## 2021-11-19 ENCOUNTER — HOSPITAL ENCOUNTER (OUTPATIENT)
Dept: NON INVASIVE DIAGNOSTICS | Age: 56
Discharge: HOME | End: 2021-11-19
Payer: MEDICARE

## 2021-11-19 PROCEDURE — 93005 ELECTROCARDIOGRAM TRACING: CPT | Performed by: PHYSICIAN ASSISTANT

## 2021-11-20 LAB
EKG ATRIAL RATE: 106 BPM
EKG P AXIS: 64 DEGREES
EKG P-R INTERVAL: 146 MS
EKG Q-T INTERVAL: 350 MS
EKG QRS DURATION: 102 MS
EKG QTC CALCULATION (BAZETT): 464 MS
EKG R AXIS: 43 DEGREES
EKG T AXIS: 20 DEGREES
EKG VENTRICULAR RATE: 106 BPM

## 2021-11-20 PROCEDURE — 93010 ELECTROCARDIOGRAM REPORT: CPT | Performed by: INTERNAL MEDICINE

## 2022-04-07 ENCOUNTER — HOSPITAL ENCOUNTER (OUTPATIENT)
Dept: GENERAL RADIOLOGY | Age: 57
Discharge: HOME | End: 2022-04-09
Payer: MEDICARE

## 2022-04-07 DIAGNOSIS — M25.511 RIGHT SHOULDER PAIN, UNSPECIFIED CHRONICITY: ICD-10-CM

## 2022-04-07 PROCEDURE — 73030 X-RAY EXAM OF SHOULDER: CPT

## 2022-05-09 ENCOUNTER — OFFICE VISIT (OUTPATIENT)
Dept: GASTROENTEROLOGY | Age: 57
End: 2022-05-09
Payer: MEDICARE

## 2022-05-09 VITALS
SYSTOLIC BLOOD PRESSURE: 130 MMHG | DIASTOLIC BLOOD PRESSURE: 76 MMHG | HEIGHT: 67 IN | BODY MASS INDEX: 28.25 KG/M2 | HEART RATE: 82 BPM | OXYGEN SATURATION: 98 % | WEIGHT: 180 LBS

## 2022-05-09 DIAGNOSIS — Z86.010 HISTORY OF COLON POLYPS: Primary | ICD-10-CM

## 2022-05-09 PROCEDURE — 4004F PT TOBACCO SCREEN RCVD TLK: CPT | Performed by: SPECIALIST

## 2022-05-09 PROCEDURE — 3017F COLORECTAL CA SCREEN DOC REV: CPT | Performed by: SPECIALIST

## 2022-05-09 PROCEDURE — G8427 DOCREV CUR MEDS BY ELIG CLIN: HCPCS | Performed by: SPECIALIST

## 2022-05-09 PROCEDURE — 99203 OFFICE O/P NEW LOW 30 MIN: CPT | Performed by: SPECIALIST

## 2022-05-09 PROCEDURE — G8419 CALC BMI OUT NRM PARAM NOF/U: HCPCS | Performed by: SPECIALIST

## 2022-05-09 RX ORDER — POLYETHYLENE GLYCOL 3350, SODIUM CHLORIDE, SODIUM BICARBONATE, POTASSIUM CHLORIDE 420; 11.2; 5.72; 1.48 G/4L; G/4L; G/4L; G/4L
4000 POWDER, FOR SOLUTION ORAL ONCE
Qty: 4000 ML | Refills: 0 | Status: SHIPPED | OUTPATIENT
Start: 2022-05-09 | End: 2022-05-09

## 2022-05-09 RX ORDER — PANTOPRAZOLE SODIUM 40 MG/1
1 TABLET, DELAYED RELEASE ORAL DAILY
COMMUNITY
Start: 2021-10-04

## 2022-05-09 RX ORDER — BUSPIRONE HYDROCHLORIDE 5 MG/1
TABLET ORAL
COMMUNITY
Start: 2022-04-13

## 2022-05-09 RX ORDER — LISINOPRIL 20 MG/1
1 TABLET ORAL DAILY
COMMUNITY

## 2022-05-09 RX ORDER — CHOLECALCIFEROL (VITAMIN D3) 50 MCG
CAPSULE ORAL
COMMUNITY
Start: 2022-04-26

## 2022-05-09 RX ORDER — HYDROXYZINE PAMOATE 25 MG/1
CAPSULE ORAL
COMMUNITY
Start: 2022-04-27

## 2022-05-09 RX ORDER — ATORVASTATIN CALCIUM 20 MG/1
TABLET, FILM COATED ORAL
COMMUNITY
Start: 2022-04-26

## 2022-05-09 ASSESSMENT — ENCOUNTER SYMPTOMS
GASTROINTESTINAL NEGATIVE: 1
VOMITING: 0
ABDOMINAL PAIN: 0
ABDOMINAL DISTENTION: 0
RESPIRATORY NEGATIVE: 1
RECTAL PAIN: 0
ANAL BLEEDING: 0
DIARRHEA: 0
CONSTIPATION: 0
NAUSEA: 0
BLOOD IN STOOL: 0
EYES NEGATIVE: 1

## 2022-05-09 NOTE — PROGRESS NOTES
Gastroenterology Clinic Visit    Marisela Hu  [id number]  Chief Complaint   Patient presents with    Consultation     due for colonoscopy; last colonoscopy 2012    Diarrhea       HPI: 62 y.o. male presents to the clinic to schedule a colonoscopy. Patient has history of colon polyp and last colonoscopy was in 2012 showed no polyps. Patient reports no abdominal pain no rectal bleeding no change in bowel habits. No history of weight loss, no family history of colorectal cancer. Social history smokes about half a pack of cigarettes a day for many years, does not drink alcohol. Patient had EGD for esophageal food impaction in October 2021. Does not have any symptoms now with no dysphagia, patient has history of GERD and has been on Protonix    Review of Systems   Constitutional: Negative. HENT: Negative. Eyes: Negative. Respiratory: Negative. Cardiovascular:        History of hypertension, under control   Gastrointestinal: Negative. Negative for abdominal distention, abdominal pain, anal bleeding, blood in stool, constipation, diarrhea, nausea, rectal pain and vomiting. No active GI issues   Genitourinary: Negative. History of kidney stones in the past   Musculoskeletal: Negative. Neurological: Negative. Psychiatric/Behavioral: Negative.          Past Medical History:   Diagnosis Date    Alcohol abuse, in remission     Backache, unspecified     Chronic gastric ulcer without mention of hemorrhage or perforation, with obstruction     Decreased libido     Depressive disorder, not elsewhere classified     Generalized anxiety disorder     Other malaise and fatigue     Reflux esophagitis     Unspecified essential hypertension     Uric acid nephrolithiasis       Past Surgical History:   Procedure Laterality Date    ANTERIOR CRUCIATE LIGAMENT REPAIR      BACK SURGERY      COLONOSCOPY  2012    LITHOTRIPSY       Current Outpatient Medications on File Prior to Visit Medication Sig Dispense Refill    Aspirin-Acetaminophen-Caffeine (EXCEDRIN PO) Take 2 tablets by mouth daily as needed (migraines)      lisinopril (PRINIVIL;ZESTRIL) 20 MG tablet Take 1 tablet by mouth daily      pantoprazole (PROTONIX) 40 MG tablet Take 1 tablet by mouth daily      hydrOXYzine (VISTARIL) 25 MG capsule take 1 capsule by oral route 3 times every day as needed for panic attack/breakthrough anxiety      atorvastatin (LIPITOR) 20 MG tablet take 1 tablet by oral route every day for High cholesterol      busPIRone (BUSPAR) 5 MG tablet take 1 tablet by oral route 3 times every day      D3 SUPER STRENGTH 50 MCG (2000 UT) CAPS take 1 Capsule by Oral route every day       No current facility-administered medications on file prior to visit. No family history on file. Social History     Socioeconomic History    Marital status: Single     Spouse name: None    Number of children: None    Years of education: None    Highest education level: None   Occupational History    None   Tobacco Use    Smoking status: Current Every Day Smoker     Packs/day: 0.50     Years: 35.00     Pack years: 17.50    Smokeless tobacco: Never Used   Substance and Sexual Activity    Alcohol use: No     Alcohol/week: 0.0 standard drinks    Drug use: None    Sexual activity: None   Other Topics Concern    None   Social History Narrative    None     Social Determinants of Health     Financial Resource Strain:     Difficulty of Paying Living Expenses: Not on file   Food Insecurity:     Worried About Running Out of Food in the Last Year: Not on file    Rodolfo of Food in the Last Year: Not on file   Transportation Needs:     Lack of Transportation (Medical): Not on file    Lack of Transportation (Non-Medical):  Not on file   Physical Activity:     Days of Exercise per Week: Not on file    Minutes of Exercise per Session: Not on file   Stress:     Feeling of Stress : Not on file   Social Connections:     Frequency of Communication with Friends and Family: Not on file    Frequency of Social Gatherings with Friends and Family: Not on file    Attends Mandaeism Services: Not on file    Active Member of Clubs or Organizations: Not on file    Attends Club or Organization Meetings: Not on file    Marital Status: Not on file   Intimate Partner Violence:     Fear of Current or Ex-Partner: Not on file    Emotionally Abused: Not on file    Physically Abused: Not on file    Sexually Abused: Not on file   Housing Stability:     Unable to Pay for Housing in the Last Year: Not on file    Number of [place] in the Last Year: Not on file    Unstable Housing in the Last Year: Not on file       Blood pressure 130/76, pulse 82, height 5' 7\" (1.702 m), weight 180 lb (81.6 kg), SpO2 98 %. Physical Exam  Constitutional:       Appearance: He is well-developed. HENT:      Head: Normocephalic. Eyes:      Pupils: Pupils are equal, round, and reactive to light. Cardiovascular:      Rate and Rhythm: Normal rate and regular rhythm. Heart sounds: Normal heart sounds. Pulmonary:      Effort: Pulmonary effort is normal.      Breath sounds: Normal breath sounds. Abdominal:      General: Bowel sounds are normal.      Palpations: Abdomen is soft. Comments: Soft nontender no palpable mass   Skin:     General: Skin is warm and dry. Neurological:      Mental Status: He is alert. Laboratory, Pathology, Radiology reviewed indetail with relevant important investigations summarized below:  Lab Results   Component Value Date    WBC 10.1 11/19/2021    HGB 16.6 11/19/2021    HCT 49.4 11/19/2021    .3 (H) 11/19/2021     11/19/2021     Lab Results   Component Value Date    ALT 26 11/19/2021    AST 19 11/19/2021    ALKPHOS 78 11/19/2021    BILITOT 0.3 11/19/2021       No results found.     Endoscopic investigations:     Assessmentand Plan:  62 y.o. male with history of colon polyps.,  Will schedule colonoscopy at   Diagnosis Orders   1. History of colon polyps  Endoscopy, colon, diagnostic     No follow-ups on file. Vanessa Mitchell MD   Staff Gastroenterologist  [facility]    Please note this report has been partially produced using speech recognition software and may cause contain errors related to thatsystem including grammar, punctuation and spelling as well as words and phrases that may seem inappropriate. If there are questions or concerns please feel free to contact me to clarify.

## 2022-06-07 ENCOUNTER — APPOINTMENT (OUTPATIENT)
Dept: GENERAL RADIOLOGY | Age: 57
DRG: 254 | End: 2022-06-07
Attending: SPECIALIST
Payer: MEDICARE

## 2022-06-07 ENCOUNTER — ANCILLARY PROCEDURE (OUTPATIENT)
Dept: ENDOSCOPY | Age: 57
DRG: 254 | End: 2022-06-07
Attending: SPECIALIST
Payer: MEDICARE

## 2022-06-07 ENCOUNTER — ANESTHESIA (OUTPATIENT)
Dept: ENDOSCOPY | Age: 57
DRG: 254 | End: 2022-06-07
Payer: MEDICARE

## 2022-06-07 ENCOUNTER — APPOINTMENT (OUTPATIENT)
Dept: CT IMAGING | Age: 57
DRG: 254 | End: 2022-06-07
Payer: MEDICARE

## 2022-06-07 ENCOUNTER — HOSPITAL ENCOUNTER (OUTPATIENT)
Age: 57
Setting detail: OUTPATIENT SURGERY
Discharge: HOME | DRG: 254 | End: 2022-06-07
Attending: SPECIALIST | Admitting: SPECIALIST
Payer: MEDICARE

## 2022-06-07 ENCOUNTER — HOSPITAL ENCOUNTER (INPATIENT)
Age: 57
LOS: 1 days | Discharge: LEFT BEFORE DISCHARGE | DRG: 254 | End: 2022-06-08
Attending: STUDENT IN AN ORGANIZED HEALTH CARE EDUCATION/TRAINING PROGRAM | Admitting: SURGERY
Payer: MEDICARE

## 2022-06-07 ENCOUNTER — ANESTHESIA EVENT (OUTPATIENT)
Dept: ENDOSCOPY | Age: 57
DRG: 254 | End: 2022-06-07
Payer: MEDICARE

## 2022-06-07 ENCOUNTER — APPOINTMENT (OUTPATIENT)
Dept: GENERAL RADIOLOGY | Age: 57
DRG: 254 | End: 2022-06-07
Payer: MEDICARE

## 2022-06-07 VITALS
BODY MASS INDEX: 28.25 KG/M2 | OXYGEN SATURATION: 96 % | RESPIRATION RATE: 16 BRPM | HEIGHT: 67 IN | TEMPERATURE: 97.6 F | DIASTOLIC BLOOD PRESSURE: 85 MMHG | SYSTOLIC BLOOD PRESSURE: 138 MMHG | HEART RATE: 76 BPM | WEIGHT: 180 LBS

## 2022-06-07 DIAGNOSIS — K63.1 PERFORATED BOWEL (HCC): Primary | ICD-10-CM

## 2022-06-07 PROBLEM — R19.8 PERFORATED ABDOMINAL VISCUS: Status: ACTIVE | Noted: 2022-06-07

## 2022-06-07 LAB
ABO/RH: NORMAL
ALBUMIN SERPL-MCNC: 4.2 G/DL (ref 3.5–4.6)
ALP BLD-CCNC: 79 U/L (ref 35–104)
ALT SERPL-CCNC: 32 U/L (ref 0–41)
ANION GAP SERPL CALCULATED.3IONS-SCNC: 13 MEQ/L (ref 9–15)
ANTIBODY SCREEN: NORMAL
APTT: 28.2 SEC (ref 24.4–36.8)
AST SERPL-CCNC: 42 U/L (ref 0–40)
BASOPHILS ABSOLUTE: 0 K/UL (ref 0–0.2)
BASOPHILS RELATIVE PERCENT: 0.9 %
BILIRUB SERPL-MCNC: 0.6 MG/DL (ref 0.2–0.7)
BUN BLDV-MCNC: 7 MG/DL (ref 6–20)
CALCIUM SERPL-MCNC: 8.4 MG/DL (ref 8.5–9.9)
CHLORIDE BLD-SCNC: 102 MEQ/L (ref 95–107)
CO2: 24 MEQ/L (ref 20–31)
CREAT SERPL-MCNC: 1.14 MG/DL (ref 0.7–1.2)
EOSINOPHILS ABSOLUTE: 0.1 K/UL (ref 0–0.7)
EOSINOPHILS RELATIVE PERCENT: 2.6 %
GFR AFRICAN AMERICAN: >60
GFR NON-AFRICAN AMERICAN: >60
GLOBULIN: 2.6 G/DL (ref 2.3–3.5)
GLUCOSE BLD-MCNC: 108 MG/DL (ref 70–99)
HCT VFR BLD CALC: 42.9 % (ref 42–52)
HEMOGLOBIN: 15 G/DL (ref 14–18)
INR BLD: 0.9
LACTIC ACID: 1 MMOL/L (ref 0.5–2.2)
LIPASE: 44 U/L (ref 12–95)
LYMPHOCYTES ABSOLUTE: 0.8 K/UL (ref 1–4.8)
LYMPHOCYTES RELATIVE PERCENT: 17.2 %
MAGNESIUM: 1.9 MG/DL (ref 1.7–2.4)
MCH RBC QN AUTO: 33.4 PG (ref 27–31.3)
MCHC RBC AUTO-ENTMCNC: 34.9 % (ref 33–37)
MCV RBC AUTO: 95.6 FL (ref 80–100)
MONOCYTES ABSOLUTE: 0.3 K/UL (ref 0.2–0.8)
MONOCYTES RELATIVE PERCENT: 7.5 %
NEUTROPHILS ABSOLUTE: 3.3 K/UL (ref 1.4–6.5)
NEUTROPHILS RELATIVE PERCENT: 71.8 %
PDW BLD-RTO: 14.3 % (ref 11.5–14.5)
PLATELET # BLD: 212 K/UL (ref 130–400)
POTASSIUM SERPL-SCNC: 3.4 MEQ/L (ref 3.4–4.9)
PROTHROMBIN TIME: 12.4 SEC (ref 12.3–14.9)
RBC # BLD: 4.48 M/UL (ref 4.7–6.1)
SODIUM BLD-SCNC: 139 MEQ/L (ref 135–144)
TOTAL CK: 88 U/L (ref 0–190)
TOTAL PROTEIN: 6.8 G/DL (ref 6.3–8)
TROPONIN: <0.01 NG/ML (ref 0–0.01)
WBC # BLD: 4.5 K/UL (ref 4.8–10.8)

## 2022-06-07 PROCEDURE — 96365 THER/PROPH/DIAG IV INF INIT: CPT

## 2022-06-07 PROCEDURE — 99255 IP/OBS CONSLTJ NEW/EST HI 80: CPT | Performed by: SURGERY

## 2022-06-07 PROCEDURE — 2000000000 HC ICU R&B

## 2022-06-07 PROCEDURE — 80053 COMPREHEN METABOLIC PANEL: CPT

## 2022-06-07 PROCEDURE — A4216 STERILE WATER/SALINE, 10 ML: HCPCS | Performed by: STUDENT IN AN ORGANIZED HEALTH CARE EDUCATION/TRAINING PROGRAM

## 2022-06-07 PROCEDURE — 85610 PROTHROMBIN TIME: CPT

## 2022-06-07 PROCEDURE — 86900 BLOOD TYPING SEROLOGIC ABO: CPT

## 2022-06-07 PROCEDURE — 99285 EMERGENCY DEPT VISIT HI MDM: CPT

## 2022-06-07 PROCEDURE — 83690 ASSAY OF LIPASE: CPT

## 2022-06-07 PROCEDURE — 2500000003 HC RX 250 WO HCPCS: Performed by: SURGERY

## 2022-06-07 PROCEDURE — 2580000003 HC RX 258: Performed by: STUDENT IN AN ORGANIZED HEALTH CARE EDUCATION/TRAINING PROGRAM

## 2022-06-07 PROCEDURE — 85730 THROMBOPLASTIN TIME PARTIAL: CPT

## 2022-06-07 PROCEDURE — 93005 ELECTROCARDIOGRAM TRACING: CPT | Performed by: STUDENT IN AN ORGANIZED HEALTH CARE EDUCATION/TRAINING PROGRAM

## 2022-06-07 PROCEDURE — 45385 COLONOSCOPY W/LESION REMOVAL: CPT | Performed by: SPECIALIST

## 2022-06-07 PROCEDURE — 2709999900 HC NON-CHARGEABLE SUPPLY: Performed by: SPECIALIST

## 2022-06-07 PROCEDURE — 88305 TISSUE EXAM BY PATHOLOGIST: CPT

## 2022-06-07 PROCEDURE — 74018 RADEX ABDOMEN 1 VIEW: CPT

## 2022-06-07 PROCEDURE — 96361 HYDRATE IV INFUSION ADD-ON: CPT

## 2022-06-07 PROCEDURE — 2580000003 HC RX 258: Performed by: SPECIALIST

## 2022-06-07 PROCEDURE — 3700000001 HC ADD 15 MINUTES (ANESTHESIA): Performed by: SPECIALIST

## 2022-06-07 PROCEDURE — 0DJD8ZZ INSPECTION OF LOWER INTESTINAL TRACT, VIA NATURAL OR ARTIFICIAL OPENING ENDOSCOPIC: ICD-10-PCS | Performed by: SPECIALIST

## 2022-06-07 PROCEDURE — 7100000011 HC PHASE II RECOVERY - ADDTL 15 MIN: Performed by: SPECIALIST

## 2022-06-07 PROCEDURE — 36415 COLL VENOUS BLD VENIPUNCTURE: CPT

## 2022-06-07 PROCEDURE — 2580000003 HC RX 258

## 2022-06-07 PROCEDURE — 2580000003 HC RX 258: Performed by: SURGERY

## 2022-06-07 PROCEDURE — 83735 ASSAY OF MAGNESIUM: CPT

## 2022-06-07 PROCEDURE — 74177 CT ABD & PELVIS W/CONTRAST: CPT

## 2022-06-07 PROCEDURE — 84484 ASSAY OF TROPONIN QUANT: CPT

## 2022-06-07 PROCEDURE — 86901 BLOOD TYPING SEROLOGIC RH(D): CPT

## 2022-06-07 PROCEDURE — 6360000002 HC RX W HCPCS: Performed by: NURSE ANESTHETIST, CERTIFIED REGISTERED

## 2022-06-07 PROCEDURE — 6370000000 HC RX 637 (ALT 250 FOR IP): Performed by: SPECIALIST

## 2022-06-07 PROCEDURE — 2500000003 HC RX 250 WO HCPCS: Performed by: STUDENT IN AN ORGANIZED HEALTH CARE EDUCATION/TRAINING PROGRAM

## 2022-06-07 PROCEDURE — 2500000003 HC RX 250 WO HCPCS: Performed by: NURSE ANESTHETIST, CERTIFIED REGISTERED

## 2022-06-07 PROCEDURE — 71045 X-RAY EXAM CHEST 1 VIEW: CPT

## 2022-06-07 PROCEDURE — 85025 COMPLETE CBC W/AUTO DIFF WBC: CPT

## 2022-06-07 PROCEDURE — 6360000004 HC RX CONTRAST MEDICATION: Performed by: STUDENT IN AN ORGANIZED HEALTH CARE EDUCATION/TRAINING PROGRAM

## 2022-06-07 PROCEDURE — 6360000002 HC RX W HCPCS: Performed by: SURGERY

## 2022-06-07 PROCEDURE — 83605 ASSAY OF LACTIC ACID: CPT

## 2022-06-07 PROCEDURE — 7100000010 HC PHASE II RECOVERY - FIRST 15 MIN: Performed by: SPECIALIST

## 2022-06-07 PROCEDURE — 6360000002 HC RX W HCPCS

## 2022-06-07 PROCEDURE — 3609027000 HC COLONOSCOPY: Performed by: SPECIALIST

## 2022-06-07 PROCEDURE — 96375 TX/PRO/DX INJ NEW DRUG ADDON: CPT

## 2022-06-07 PROCEDURE — 86850 RBC ANTIBODY SCREEN: CPT

## 2022-06-07 PROCEDURE — 96376 TX/PRO/DX INJ SAME DRUG ADON: CPT

## 2022-06-07 PROCEDURE — 82550 ASSAY OF CK (CPK): CPT

## 2022-06-07 PROCEDURE — 6360000002 HC RX W HCPCS: Performed by: STUDENT IN AN ORGANIZED HEALTH CARE EDUCATION/TRAINING PROGRAM

## 2022-06-07 PROCEDURE — 3700000000 HC ANESTHESIA ATTENDED CARE: Performed by: SPECIALIST

## 2022-06-07 RX ORDER — 0.9 % SODIUM CHLORIDE 0.9 %
1000 INTRAVENOUS SOLUTION INTRAVENOUS ONCE
Status: COMPLETED | OUTPATIENT
Start: 2022-06-07 | End: 2022-06-07

## 2022-06-07 RX ORDER — SODIUM CHLORIDE 9 MG/ML
INJECTION, SOLUTION INTRAVENOUS PRN
Status: DISCONTINUED | OUTPATIENT
Start: 2022-06-07 | End: 2022-06-07 | Stop reason: HOSPADM

## 2022-06-07 RX ORDER — LIDOCAINE HYDROCHLORIDE 20 MG/ML
INJECTION, SOLUTION INFILTRATION; PERINEURAL PRN
Status: DISCONTINUED | OUTPATIENT
Start: 2022-06-07 | End: 2022-06-07 | Stop reason: SDUPTHER

## 2022-06-07 RX ORDER — ONDANSETRON 4 MG/1
4 TABLET, ORALLY DISINTEGRATING ORAL EVERY 8 HOURS PRN
Status: DISCONTINUED | OUTPATIENT
Start: 2022-06-07 | End: 2022-06-08 | Stop reason: HOSPADM

## 2022-06-07 RX ORDER — SODIUM CHLORIDE, SODIUM LACTATE, POTASSIUM CHLORIDE, CALCIUM CHLORIDE 600; 310; 30; 20 MG/100ML; MG/100ML; MG/100ML; MG/100ML
INJECTION, SOLUTION INTRAVENOUS CONTINUOUS
Status: DISCONTINUED | OUTPATIENT
Start: 2022-06-07 | End: 2022-06-08

## 2022-06-07 RX ORDER — SODIUM CHLORIDE 0.9 % (FLUSH) 0.9 %
5-40 SYRINGE (ML) INJECTION PRN
Status: DISCONTINUED | OUTPATIENT
Start: 2022-06-07 | End: 2022-06-07 | Stop reason: HOSPADM

## 2022-06-07 RX ORDER — DEXTROSE AND SODIUM CHLORIDE 5; .9 G/100ML; G/100ML
1000 INJECTION, SOLUTION INTRAVENOUS ONCE
Status: COMPLETED | OUTPATIENT
Start: 2022-06-07 | End: 2022-06-07

## 2022-06-07 RX ORDER — ONDANSETRON 2 MG/ML
4 INJECTION INTRAMUSCULAR; INTRAVENOUS ONCE
Status: COMPLETED | OUTPATIENT
Start: 2022-06-07 | End: 2022-06-07

## 2022-06-07 RX ORDER — SODIUM CHLORIDE 9 MG/ML
INJECTION, SOLUTION INTRAVENOUS PRN
Status: DISCONTINUED | OUTPATIENT
Start: 2022-06-07 | End: 2022-06-08 | Stop reason: HOSPADM

## 2022-06-07 RX ORDER — LABETALOL HYDROCHLORIDE 5 MG/ML
10 INJECTION, SOLUTION INTRAVENOUS EVERY 4 HOURS PRN
Status: DISCONTINUED | OUTPATIENT
Start: 2022-06-07 | End: 2022-06-07

## 2022-06-07 RX ORDER — PROPOFOL 10 MG/ML
INJECTION, EMULSION INTRAVENOUS PRN
Status: DISCONTINUED | OUTPATIENT
Start: 2022-06-07 | End: 2022-06-07 | Stop reason: SDUPTHER

## 2022-06-07 RX ORDER — MAGNESIUM HYDROXIDE 1200 MG/15ML
LIQUID ORAL PRN
Status: DISCONTINUED | OUTPATIENT
Start: 2022-06-07 | End: 2022-06-07 | Stop reason: ALTCHOICE

## 2022-06-07 RX ORDER — SODIUM CHLORIDE 9 MG/ML
INJECTION, SOLUTION INTRAVENOUS CONTINUOUS
Status: DISCONTINUED | OUTPATIENT
Start: 2022-06-07 | End: 2022-06-07 | Stop reason: HOSPADM

## 2022-06-07 RX ORDER — LABETALOL HYDROCHLORIDE 5 MG/ML
20 INJECTION, SOLUTION INTRAVENOUS EVERY 4 HOURS PRN
Status: DISCONTINUED | OUTPATIENT
Start: 2022-06-07 | End: 2022-06-08 | Stop reason: HOSPADM

## 2022-06-07 RX ORDER — SIMETHICONE 20 MG/.3ML
EMULSION ORAL PRN
Status: DISCONTINUED | OUTPATIENT
Start: 2022-06-07 | End: 2022-06-07 | Stop reason: ALTCHOICE

## 2022-06-07 RX ORDER — ENOXAPARIN SODIUM 100 MG/ML
40 INJECTION SUBCUTANEOUS DAILY
Status: DISCONTINUED | OUTPATIENT
Start: 2022-06-07 | End: 2022-06-08 | Stop reason: HOSPADM

## 2022-06-07 RX ORDER — ONDANSETRON 2 MG/ML
4 INJECTION INTRAMUSCULAR; INTRAVENOUS
Status: DISCONTINUED | OUTPATIENT
Start: 2022-06-07 | End: 2022-06-07 | Stop reason: HOSPADM

## 2022-06-07 RX ORDER — SIMETHICONE 20 MG/.3ML
EMULSION ORAL
Status: DISCONTINUED
Start: 2022-06-07 | End: 2022-06-07 | Stop reason: HOSPADM

## 2022-06-07 RX ORDER — LIDOCAINE HYDROCHLORIDE 10 MG/ML
1 INJECTION, SOLUTION EPIDURAL; INFILTRATION; INTRACAUDAL; PERINEURAL
Status: DISCONTINUED | OUTPATIENT
Start: 2022-06-07 | End: 2022-06-07 | Stop reason: HOSPADM

## 2022-06-07 RX ORDER — SODIUM CHLORIDE 0.9 % (FLUSH) 0.9 %
5-40 SYRINGE (ML) INJECTION PRN
Status: DISCONTINUED | OUTPATIENT
Start: 2022-06-07 | End: 2022-06-08 | Stop reason: HOSPADM

## 2022-06-07 RX ORDER — ONDANSETRON 2 MG/ML
4 INJECTION INTRAMUSCULAR; INTRAVENOUS EVERY 6 HOURS PRN
Status: DISCONTINUED | OUTPATIENT
Start: 2022-06-07 | End: 2022-06-08 | Stop reason: HOSPADM

## 2022-06-07 RX ORDER — SODIUM CHLORIDE 0.9 % (FLUSH) 0.9 %
5-40 SYRINGE (ML) INJECTION EVERY 12 HOURS SCHEDULED
Status: DISCONTINUED | OUTPATIENT
Start: 2022-06-07 | End: 2022-06-08 | Stop reason: HOSPADM

## 2022-06-07 RX ORDER — SODIUM CHLORIDE 0.9 % (FLUSH) 0.9 %
5-40 SYRINGE (ML) INJECTION EVERY 12 HOURS SCHEDULED
Status: DISCONTINUED | OUTPATIENT
Start: 2022-06-07 | End: 2022-06-07 | Stop reason: HOSPADM

## 2022-06-07 RX ORDER — SODIUM CHLORIDE 9 MG/ML
INJECTION, SOLUTION INTRAVENOUS
Status: COMPLETED
Start: 2022-06-07 | End: 2022-06-07

## 2022-06-07 RX ADMIN — PIPERACILLIN AND TAZOBACTAM 3375 MG: 3; .375 INJECTION, POWDER, LYOPHILIZED, FOR SOLUTION INTRAVENOUS at 21:18

## 2022-06-07 RX ADMIN — HYDROMORPHONE HYDROCHLORIDE 1 MG: 1 INJECTION, SOLUTION INTRAMUSCULAR; INTRAVENOUS; SUBCUTANEOUS at 21:13

## 2022-06-07 RX ADMIN — HYDROMORPHONE HYDROCHLORIDE 1 MG: 1 INJECTION, SOLUTION INTRAMUSCULAR; INTRAVENOUS; SUBCUTANEOUS at 13:19

## 2022-06-07 RX ADMIN — DEXTROSE AND SODIUM CHLORIDE 1000 ML: 5; 900 INJECTION, SOLUTION INTRAVENOUS at 16:01

## 2022-06-07 RX ADMIN — Medication 1 MG: at 13:19

## 2022-06-07 RX ADMIN — HYDROMORPHONE HYDROCHLORIDE 1 MG: 1 INJECTION, SOLUTION INTRAMUSCULAR; INTRAVENOUS; SUBCUTANEOUS at 16:30

## 2022-06-07 RX ADMIN — SODIUM CHLORIDE: 9 INJECTION, SOLUTION INTRAVENOUS at 06:52

## 2022-06-07 RX ADMIN — PIPERACILLIN SODIUM AND TAZOBACTAM SODIUM 4500 MG: 4; .5 INJECTION, POWDER, LYOPHILIZED, FOR SOLUTION INTRAVENOUS at 15:59

## 2022-06-07 RX ADMIN — IOPAMIDOL 100 ML: 612 INJECTION, SOLUTION INTRAVENOUS at 11:56

## 2022-06-07 RX ADMIN — LIDOCAINE HYDROCHLORIDE 60 MG: 20 INJECTION, SOLUTION INFILTRATION; PERINEURAL at 08:03

## 2022-06-07 RX ADMIN — LABETALOL HYDROCHLORIDE 20 MG: 5 INJECTION, SOLUTION INTRAVENOUS at 18:29

## 2022-06-07 RX ADMIN — HYDROMORPHONE HYDROCHLORIDE 1 MG: 1 INJECTION, SOLUTION INTRAMUSCULAR; INTRAVENOUS; SUBCUTANEOUS at 10:31

## 2022-06-07 RX ADMIN — ONDANSETRON 4 MG: 2 INJECTION INTRAMUSCULAR; INTRAVENOUS at 10:31

## 2022-06-07 RX ADMIN — HYDROMORPHONE HYDROCHLORIDE 1 MG: 1 INJECTION, SOLUTION INTRAMUSCULAR; INTRAVENOUS; SUBCUTANEOUS at 18:29

## 2022-06-07 RX ADMIN — SODIUM CHLORIDE, POTASSIUM CHLORIDE, SODIUM LACTATE AND CALCIUM CHLORIDE: 600; 310; 30; 20 INJECTION, SOLUTION INTRAVENOUS at 16:23

## 2022-06-07 RX ADMIN — FAMOTIDINE 20 MG: 10 INJECTION INTRAVENOUS at 10:30

## 2022-06-07 RX ADMIN — SODIUM CHLORIDE, PRESERVATIVE FREE 10 ML: 5 INJECTION INTRAVENOUS at 21:14

## 2022-06-07 RX ADMIN — ENOXAPARIN SODIUM 40 MG: 100 INJECTION SUBCUTANEOUS at 16:02

## 2022-06-07 RX ADMIN — PIPERACILLIN AND TAZOBACTAM 3375 MG: 3; .375 INJECTION, POWDER, LYOPHILIZED, FOR SOLUTION INTRAVENOUS at 10:38

## 2022-06-07 RX ADMIN — HYDROMORPHONE HYDROCHLORIDE 1 MG: 1 INJECTION, SOLUTION INTRAMUSCULAR; INTRAVENOUS; SUBCUTANEOUS at 11:45

## 2022-06-07 RX ADMIN — PROPOFOL 470 MG: 10 INJECTION, EMULSION INTRAVENOUS at 08:03

## 2022-06-07 RX ADMIN — ONDANSETRON 4 MG: 2 INJECTION INTRAMUSCULAR; INTRAVENOUS at 13:19

## 2022-06-07 RX ADMIN — SODIUM CHLORIDE 1000 ML: 9 INJECTION, SOLUTION INTRAVENOUS at 10:29

## 2022-06-07 RX ADMIN — LABETALOL HYDROCHLORIDE 10 MG: 5 INJECTION, SOLUTION INTRAVENOUS at 17:20

## 2022-06-07 ASSESSMENT — ENCOUNTER SYMPTOMS
BACK PAIN: 1
ABDOMINAL DISTENTION: 1
COUGH: 0
RHINORRHEA: 0
SORE THROAT: 0
DIARRHEA: 0
ABDOMINAL PAIN: 1
NAUSEA: 0
SHORTNESS OF BREATH: 0
EYE PAIN: 0
VOMITING: 0

## 2022-06-07 ASSESSMENT — PAIN DESCRIPTION - FREQUENCY
FREQUENCY: CONTINUOUS
FREQUENCY: CONTINUOUS

## 2022-06-07 ASSESSMENT — PAIN SCALES - GENERAL
PAINLEVEL_OUTOF10: 10
PAINLEVEL_OUTOF10: 3
PAINLEVEL_OUTOF10: 10
PAINLEVEL_OUTOF10: 8
PAINLEVEL_OUTOF10: 8
PAINLEVEL_OUTOF10: 10
PAINLEVEL_OUTOF10: 8
PAINLEVEL_OUTOF10: 6
PAINLEVEL_OUTOF10: 10
PAINLEVEL_OUTOF10: 10
PAINLEVEL_OUTOF10: 0
PAINLEVEL_OUTOF10: 10

## 2022-06-07 ASSESSMENT — PAIN DESCRIPTION - DESCRIPTORS
DESCRIPTORS: ACHING;DISCOMFORT
DESCRIPTORS: ACHING;DISCOMFORT

## 2022-06-07 ASSESSMENT — PAIN - FUNCTIONAL ASSESSMENT
PAIN_FUNCTIONAL_ASSESSMENT: 0-10
PAIN_FUNCTIONAL_ASSESSMENT: 0-10
PAIN_FUNCTIONAL_ASSESSMENT: PREVENTS OR INTERFERES SOME ACTIVE ACTIVITIES AND ADLS
PAIN_FUNCTIONAL_ASSESSMENT: PREVENTS OR INTERFERES SOME ACTIVE ACTIVITIES AND ADLS

## 2022-06-07 ASSESSMENT — PAIN DESCRIPTION - LOCATION
LOCATION: ABDOMEN

## 2022-06-07 ASSESSMENT — PAIN DESCRIPTION - ONSET
ONSET: ON-GOING
ONSET: ON-GOING

## 2022-06-07 ASSESSMENT — LIFESTYLE VARIABLES: SMOKING_STATUS: 1

## 2022-06-07 NOTE — Clinical Note
Discharge Plan[de-identified] Other/[facility])   Telemetry/Cardiac Monitoring Required?: No   Bed request comments: TCU

## 2022-06-07 NOTE — ANESTHESIA POSTPROCEDURE EVALUATION
Department of Anesthesiology  Postprocedure Note    Patient: Magan Mayer  MRN: [id number]  YOB: 1965  Date of evaluation: 6/7/2022  Time:  8:26 AM     Procedure Summary     Date: 06/07/22 Room / Location: [address] [place] [address]    Anesthesia Start: 0801 Anesthesia Stop: 1537    Procedure: COLORECTAL CANCER SCREENING, HIGH RISK (N/A ) Diagnosis: (history of colon polyps  Z86.010)    Surgeons: Aiden Velasco MD Responsible Provider: JUSTEN Umanzor CRNA    Anesthesia Type: MAC ASA Status: 3          Anesthesia Type: No value filed. Pepe Phase I: Pepe Score: 10    Pepe Phase II:      Last vitals: Reviewed and per EMR flowsheets.        Anesthesia Post Evaluation    Patient location during evaluation: PACU  Patient participation: complete - patient participated  Level of consciousness: awake  Pain score: 0  Airway patency: patent  Nausea & Vomiting: no nausea and no vomiting  Complications: no  Cardiovascular status: hemodynamically stable  Respiratory status: acceptable  Hydration status: euvolemic

## 2022-06-07 NOTE — ACP (ADVANCE CARE PLANNING)
Advance Care Planning     Advance Care Planning Activator (Inpatient)  Conversation Note      Date of ACP Conversation: 2022     Conversation Conducted with: Patient with Decision Making Capacity    ACP Activator: Gee Yanez RN    Pt was given information on advance directives per request.    Health Care Decision Maker:     Current Designated Health Care Decision Maker:     Primary Decision Maker: Malka Prasad [facility] - [phone]      Care Preferences    Ventilation: \"If you were in your present state of health and suddenly became very ill and were unable to breathe on your own, what would your preference be about the use of a ventilator (breathing machine) if it were available to you? \"      Would the patient desire the use of ventilator (breathing machine)?: yes    \"If your health worsens and it becomes clear that your chance of recovery is unlikely, what would your preference be about the use of a ventilator (breathing machine) if it were available to you? \"     Would the patient desire the use of ventilator (breathing machine)?: Yes      Resuscitation  \"CPR works best to restart the heart when there is a sudden event, like a heart attack, in someone who is otherwise healthy. Unfortunately, CPR does not typically restart the heart for people who have serious health conditions or who are very sick. \"    \"In the event your heart stopped as a result of an underlying serious health condition, would you want attempts to be made to restart your heart (answer \"yes\" for attempt to resuscitate) or would you prefer a natural death (answer \"no\" for do not attempt to resuscitate)? \" yes       [x] Yes   [] No   Educated Patient / Nydia Apgar regarding differences between Advance Directives and portable DNR orders.     Length of ACP Conversation in minutes:  10  Conversation Outcomes:  [x] ACP discussion completed  [] Existing advance directive reviewed with patient; no changes to patient's previously recorded wishes  [] New Advance Directive completed  [] Portable Do Not Rescitate prepared for Provider review and signature  [] POLST/POST/MOLST/MOST prepared for Provider review and signature      Follow-up plan:    [] Schedule follow-up conversation to continue planning  [] Referred individual to Provider for additional questions/concerns   [] Advised patient/agent/surrogate to review completed ACP document and update if needed with changes in condition, patient preferences or care setting    [x] This note routed to one or more involved healthcare providers

## 2022-06-07 NOTE — ED PROVIDER NOTES
[facility]  eMERGENCY dEPARTMENT eNCOUnter      Pt Name: Aries Woods  MRN: [id number]  [place] 1965  Date of evaluation: 6/7/2022  Provider: Ludmila Luz MD      HISTORY OF PRESENT ILLNESS      Chief Complaint   Patient presents with    Abdominal Pain     pt had a colonoscopy and now pt has a perforated bowel. The history is provided by the Patient and Trauma Surgeon  Aries Woods is a 62 y.o. male with a PMH clinically significant for ADHD, Anxiety, GERD, Gastric ulcers, Etoh abuse, HTN presenting to the ED via in hospital transport by trauma surgery from endoscopy suite status post postsurgical complication. Patient underwent colonoscopy today with subsequent abdominal pain and KUB showing likely perforation. Patient complaining of abdominal pain that is severe, bandlike across the diffuse abdomen. Does radiate to the back and shoulders. Worse with any movement and palpation. Currently denying any associated shortness of breath, chest pain, nausea, vomiting. Denies any urinary symptoms. Nothing for relief prior to arrival in the ED. Denies any anticoagulation. States no history of similar previous episodes. States they have otherwise been feeling well. Taking all medications as indicated. Per Chart Review: Prior procedure appreciated. REVIEW OF SYSTEMS       Review of Systems   Constitutional: Positive for appetite change. Negative for chills and fever. HENT: Negative for rhinorrhea and sore throat. Eyes: Negative for pain and visual disturbance. Respiratory: Negative for cough and shortness of breath. Cardiovascular: Negative for chest pain and palpitations. Gastrointestinal: Positive for abdominal distention and abdominal pain. Negative for diarrhea, nausea and vomiting. Genitourinary: Negative for dysuria. Musculoskeletal: Positive for arthralgias and back pain. Negative for neck pain. Skin: Negative for rash.    Neurological: Negative for weakness, numbness and headaches. PAST MEDICAL HISTORY     Past Medical History:   Diagnosis Date    Alcohol abuse, in remission     Backache, unspecified     Chronic gastric ulcer without mention of hemorrhage or perforation, with obstruction     Decreased libido     Depressive disorder, not elsewhere classified     Generalized anxiety disorder     Other malaise and fatigue     Reflux esophagitis     Unspecified essential hypertension     Uric acid nephrolithiasis        SURGICAL HISTORY       Past Surgical History:   Procedure Laterality Date    ANTERIOR CRUCIATE LIGAMENT REPAIR      BACK SURGERY      COLONOSCOPY  2012    COLONOSCOPY N/A 6/7/2022    COLORECTAL CANCER SCREENING, HIGH RISK performed by Mehdi Pearson MD at [address]       Family History   Problem Relation Age of Onset    Colon Cancer Neg Hx        SOCIAL HISTORY       Social History     Socioeconomic History    Marital status: Single     Spouse name: None    Number of children: None    Years of education: None    Highest education level: None   Occupational History    None   Tobacco Use    Smoking status: Current Every Day Smoker     Packs/day: 0.50     Years: 35.00     Pack years: 17.50    Smokeless tobacco: Never Used   Vaping Use    Vaping Use: Former   Substance and Sexual Activity    Alcohol use: Yes     Alcohol/week: 2.0 - 3.0 standard drinks     Types: 2 - 3 Cans of beer per week     Comment: every other day drinker    Drug use: Never    Sexual activity: None   Other Topics Concern    None   Social History Narrative    None     Social Determinants of Health     Financial Resource Strain:     Difficulty of Paying Living Expenses: Not on file   Food Insecurity:     Worried About Running Out of Food in the Last Year: Not on file    Rodolfo of Food in the Last Year: Not on file   Transportation Needs:     Lack of Transportation (Medical):  Not on file  Lack of Transportation (Non-Medical): Not on file   Physical Activity:     Days of Exercise per Week: Not on file    Minutes of Exercise per Session: Not on file   Stress:     Feeling of Stress : Not on file   Social Connections:     Frequency of Communication with Friends and Family: Not on file    Frequency of Social Gatherings with Friends and Family: Not on file    Attends Anabaptism Services: Not on file    Active Member of [address] [place] [address] or Organizations: Not on file    Attends Club or Organization Meetings: Not on file    Marital Status: Not on file   Intimate Partner Violence:     Fear of Current or Ex-Partner: Not on file    Emotionally Abused: Not on file    Physically Abused: Not on file    Sexually Abused: Not on file   Housing Stability:     Unable to Pay for Housing in the Last Year: Not on file    Number of [place] in the Last Year: Not on file    Unstable Housing in the Last Year: Not on file       CURRENT MEDICATIONS       Discharge Medication List as of 6/8/2022  3:15 PM      CONTINUE these medications which have NOT CHANGED    Details   Aspirin-Acetaminophen-Caffeine (EXCEDRIN PO) Take 2 tablets by mouth daily as needed (migraines)Historical Med      lisinopril (PRINIVIL;ZESTRIL) 20 MG tablet Take 1 tablet by mouth dailyHistorical Med      pantoprazole (PROTONIX) 40 MG tablet Take 1 tablet by mouth dailyHistorical Med      hydrOXYzine (VISTARIL) 25 MG capsule take 1 capsule by oral route 3 times every day as needed for panic attack/breakthrough anxietyHistorical Med      atorvastatin (LIPITOR) 20 MG tablet take 1 tablet by oral route every day for High cholesterolHistorical Med      busPIRone (BUSPAR) 5 MG tablet take 1 tablet by oral route 3 times every dayHistorical Med      D3 SUPER STRENGTH 50 MCG (2000 UT) CAPS take 1 Capsule by Oral route every day, DAWHistorical Med             ALLERGIES     Patient has no known allergies.       PHYSICAL EXAM       ED Triage Vitals [06/07/22 components:       Result Value    Glucose 108 (*)     Calcium 8.4 (*)     AST 42 (*)     All other components within normal limits   CBC WITH AUTO DIFFERENTIAL - Abnormal; Notable for the following components:    WBC 4.5 (*)     RBC 4.48 (*)     MCH 33.4 (*)     Lymphocytes Absolute 0.8 (*)     All other components within normal limits   LACTIC ACID - Abnormal; Notable for the following components:    Lactic Acid 2.4 (*)     All other components within normal limits   BASIC METABOLIC PANEL W/ REFLEX TO MG FOR LOW K - Abnormal; Notable for the following components:    Chloride 109 (*)     CO2 18 (*)     Anion Gap 17 (*)     Glucose 114 (*)     Calcium 8.2 (*)     All other components within normal limits   CBC WITH AUTO DIFFERENTIAL - Abnormal; Notable for the following components:    RBC 3.96 (*)     Hemoglobin 13.6 (*)     Hematocrit 39.2 (*)     MCH 34.3 (*)     RDW 14.6 (*)     Neutrophils Absolute 8.5 (*)     Lymphocytes Absolute 0.8 (*)     All other components within normal limits   PROTIME-INR   APTT   LACTIC ACID   MAGNESIUM   CK   TROPONIN   LIPASE   TYPE AND SCREEN       CT ABDOMEN PELVIS W IV CONTRAST Additional Contrast? Rectal   Final Result   Impression:      1. Stable pneumoperitoneum. No extravasation of oral contrast outside bowel. 2. A 5 mm left renal nonobstructing stone. No hydronephrosis. 3. Sigmoid diverticulosis with no evidence of diverticulitis. 4. Bibasilar pulmonary atelectasis. CT ABDOMEN PELVIS W IV CONTRAST Additional Contrast? None   Final Result      Extensive pneumoperitoneum as described above, from recent colonoscopy procedure. COMMUNICATION:  Communicated with: Dr. Orly Vidalse on 6/7/2022 at 12:45 PM.           ==========         XR CHEST PORTABLE   Final Result      PNEUMOPERITONEUM. THE ABOVE FINDINGS WERE DISCUSSED VIA TELEPHONE WITH DR. Caryl Santana EMERGENCY DEPARTMENT, AT 11:18 AM, TUESDAY, JUNE 7, AT 1118 HOURS.           ED Course as of 06/10/22 0931   Tue Jun 07, 2022   1032 EKG 12 Lead  Normal sinus rhythm, rate of 67 bpm.  RBBB. Normal axis, otherwise normal intervals. No gross acute ST-T wave abnormalities. [NA]   1143 Comprehensive Metabolic Panel(!):    Sodium 139   Potassium 3.4   Chloride 102   CO2 24   Anion Gap 13   GLUCOSE, FASTING,(!)   BUN,BUNPL 7   Creatinine 1.14   GFR Non- >60.0   GFR  >60.0   CALCIUM, SERUM, 212149 8.4(!)   Total Protein 6.8   Albumin 4.2   Bilirubin 0.6   Alk Phos 79   ALT 32   AST 42(!)   Globulin 2.6  Unremarkable. [NA]   1144 Lipase: 44 [NA]   1144 Total CK: 88 [NA]   1144 Magnesium: 1.9 [NA]   1144 Troponin: <0.010 [NA]   1144 Lactic Acid: 1.0 [NA]   1144 aPTT: 28.2 [NA]   1144 Prothrombin Time: 12.4 [NA]   1144 INR: 0.9 [NA]   1144 WBC(!): 4.5  Mild leukopenia. Otherwise largely unremarkable. [NA]      ED Course User Index  [NA] Zulma Mancera MD       62 y.o. male with a PMH clinically significant for ADHD, Anxiety, GERD, Gastric ulcers, Etoh abuse, HTN presenting to the ED via in hospital transport by trauma surgery from endoscopy suite status post postsurgical complication. Upon initial evaluation, Pt in mild acute distress 2/2 pain, but otherwise HDS and grossly NV intact. PE as noted above. Labs, , EKG, and Imaging as noted above. Given findings, clinical presentation most likely consistent w/ perforated bowels status post colonoscopy with significant mount of free intraperitoneal air. Trauma surgery already aware the patient, brought him to the ED. Will serve as surgical consult. Pain medications and fluids administered as noted below. CT A/P with evidence of significant pneumoperitoneum. Labs as noted above. Pt was administered antibiotics and analgesics as noted below.   Medications   0.9 % sodium chloride bolus (0 mLs IntraVENous Stopped 6/7/22 1137)   HYDROmorphone (DILAUDID) injection 1 mg (1 mg IntraVENous Given 6/7/22 1031)   ondansetron [facility]) injection 4 mg (4 mg IntraVENous Given 6/7/22 1031)   famotidine (PEPCID) 20 mg in sodium chloride (PF) 10 mL injection (20 mg IntraVENous Given 6/7/22 1030)   piperacillin-tazobactam (ZOSYN) 3,375 mg in dextrose 5 % 50 mL IVPB (mini-bag) (0 mg IntraVENous Stopped 6/7/22 1110)   iopamidol (ISOVUE-300) 61 % injection 100 mL (100 mLs IntraVENous Given 6/7/22 1156)   HYDROmorphone (DILAUDID) injection 1 mg (1 mg IntraVENous Given 6/7/22 1145)   dextrose 5 % and 0.9 % sodium chloride infusion (0 mLs IntraVENous Stopped 6/7/22 1622)   ondansetron (ZOFRAN) injection 4 mg (4 mg IntraVENous Given 6/7/22 1319)   HYDROmorphone (DILAUDID) injection 1 mg (1 mg IntraVENous Given 6/7/22 1319)   piperacillin-tazobactam (ZOSYN) 4,500 mg in dextrose 5 % 100 mL IVPB (mini-bag) (0 mg IntraVENous Stopped 6/7/22 1623)   iopamidol (ISOVUE-370) 76 % injection 100 mL (100 mLs IntraVENous Given 6/8/22 0531)       Plan: Admit to Trauma Surgery for further evaluation and management. Report given to Dr. Jefferson Chaudhary. and Patient understanding and amenable to the POC. CRITICAL CARE TIME   Total CriticalCare time was 0 minutes, excluding separately reportable procedures. There was a high probability of clinically significant/life threatening deterioration in the patient's condition which required my urgent intervention. FINAL IMPRESSION      1.  Perforated bowel ([facility] [address]          DISPOSITION/PLAN   DISPOSITION Admitted 06/07/2022 01:21:18 PM      Discharge Medication List as of 6/8/2022  3:15 PM      START taking these medications    Details   amoxicillin-clavulanate (AUGMENTIN) 875-125 MG per tablet Take 1 tablet by mouth every 8 hours for 3 days, Disp-9 tablet, R-0Normal              MD Carmela Gruber MD  06/10/22 9091

## 2022-06-07 NOTE — CARE COORDINATION
[facility] EMERGENCY [facility] CENTER AT [place] Case Management Initial Discharge Assessment    Met with Patient to discuss discharge plan. PCP: Sarahi Ayers                                Date of Last Visit: weeks    [facility] Patient: No        [facility] Notified: no    If no PCP, list provided? N/A    Discharge Planning    Living Arrangements: independently at home    Who do you live with? parents    Who helps you with your care:  self    If lives at home:     Do you have any barriers navigating in your home? no    Patient can perform ADL? Yes    Current Services (outpatient and in home) :  None    Dialysis: No    Is transportation available to get to your appointments? Yes    DME Equipment:  no    Respiratory equipment: None    Respiratory provider:  no     [facility]    Consult with Medication Assistance Program?  No        Does Patient Have a High-Risk for Readmission Diagnosis (CHF, PN, MI, COPD)? No      Initial Discharge Plan? (Note: please see concurrent daily documentation for any updates after initial note). Pt was given information on hhc/rehab/snf resources as he states he is having surgery and is not sure what his d/c needs will be. Cm to follow for d/c needs.     Readmission Risk              Risk of Unplanned Readmission:  0         Electronically signed by Moira Acuña RN on 6/7/2022 at 12:46 PM

## 2022-06-07 NOTE — PROGRESS NOTES
Serial exam      Pt appears more comfortable. Had eyes closed on my arrival, not writhing as he was before. Tolerated lying flat. HR normal, SpO2 95% on room air. Mildly hypertensive to . Still with tenderness and voluntary guarding across abdomen at level of umbilicus with rebound tenderness. Mild tenderness in LLQ, nontender upper quadrants. Plan for repeat labs in am. CT A/P with IV and rectal contrast at 0600. Surgical need/plan pending repeat CT scan. Discussed plans with nursing. Pt and significant other voiced understanding of plan.     Stacey Keating MD  Trauma, Surgical Critical Care, and Emergency General Surgery

## 2022-06-07 NOTE — ED TRIAGE NOTES
Pt had a colonoscopy this am and pt now has a perforated bowel   Pt states his abd pain is a 10 out of 10  Pt moaning in bed   Pt is A&O x 4  Pt came to ed via wheel chair with trauma surgeon

## 2022-06-07 NOTE — ED NOTES
Tried to give report to the nurse on [facility] (will call back d/c a pt)    Oleksandr Iqbal RN  06/07/22 Yolanda Russell RN  06/07/22 2918

## 2022-06-07 NOTE — FLOWSHEET NOTE
1515- patient here in icu room 11. Patient able to walk to bed from wheel chair. Patient very tense and not talking much.

## 2022-06-07 NOTE — ED NOTES
Pt sitting up in a chair. Visitor at bedside. Pt is barefoot. Wanted to put socks on him but pt declined. Monitor leads were pulled off. Reapplied. MP ST with occasional PVC. Aware we are waiting on ICU to clean bed and come get patient. Declines wanting to get into bed.       Percy Lester RN  06/07/22 0688

## 2022-06-07 NOTE — ED NOTES
Report given to the ICU nurse at bedside. Pt wanted to go up to ICU in wheel chair.       Tammy Singh RN  06/07/22 0735

## 2022-06-07 NOTE — H&P
EMERGENCY GENERAL SURGERY CONSULTATION / H&P    Reason for Consultation:  Free air after colonoscopy  Consulting Provider: Neelima Prieto MD     I initially saw the patient in the GI recovery room and escorted him to the ED so that IV placement, abx, and CT scan could be rapidly obtained. HISTORY OF PRESENT INJURY:   Hedy Etienne is a 62 y.o. male with history of gastric ulcers, HTN, and HL who presented today to [facility] for screening colonoscopy. He had multiple polyps noted, most of which were biopsied with a cold snare however one in the descending and sigmoid colon was pedunculated and biopsied with a hot snare. The patient developed abdominal pain in the recovery room and a KUB was obtained that showed free air. Dr. Paty Chopra consulted me for these findings. The patient reports 10/10 abdominal pain bandlike across the center of his abdomen that radiates to his shoulders. Worse with turning. Steady but not increasing since the end of the colonoscopy. Pain is less severe than kidney stone pain. Pt complains of abdominal bloating as well. In the 2 weeks prior to the colonoscopy, he has multiple episodes of retching and vomiting. No hematemesis. Subjective fevers and chills without muscle aches. Denies weight loss. Tolerated the colonoscopy prep ok.     Last c-scope 2012 was normal. Did not have an upper endoscopy today    C-scope findings today:  FINDINGS  Anal Canal - Normal  Rectum - Normal  Sigmoid Colon - Diverticulosis moderate , polyp measuring about 5 to 6 mm  in size seen which was removed using a cold snare, another  pedunculated polyp measuring about 10 mm size seen which  was removed using a hot snare  Descending Colon - Diverticulosis moderate , 10 mm pedunculated polyp  removed using a hot snare  Splenic Flexure - Normal  Transverse Colon - Normal  Hepatic Flexure - Normal  Ascending Colon - 6 to7 mm polyp removed using a hot snare  Cecum - Normal  Ileocecal Valve - 45 mm sessile polyp removed using a cold snare could not  be retrieved    PMH:    Past Medical History:   Diagnosis Date    Alcohol abuse, in remission     Backache, unspecified     Chronic gastric ulcer without mention of hemorrhage or perforation, with obstruction     Decreased libido     Depressive disorder, not elsewhere classified     Generalized anxiety disorder     Other malaise and fatigue     Reflux esophagitis     Unspecified essential hypertension     Uric acid nephrolithiasis        PSH:    Past Surgical History:   Procedure Laterality Date    ANTERIOR CRUCIATE LIGAMENT REPAIR      BACK SURGERY      COLONOSCOPY  2012    COLONOSCOPY N/A 6/7/2022    COLORECTAL CANCER SCREENING, HIGH RISK performed by Analia Villarreal MD at [address] [place] [address]         FH:    Family History   Problem Relation Age of Onset    Colon Cancer Neg Hx      Pt denies family history of colon cancer  denies h/o bleeding or clotting disorders    SH:    Social History     Tobacco Use    Smoking status: Current Every Day Smoker     Packs/day: 0.50     Years: 35.00     Pack years: 17.50    Smokeless tobacco: Never Used   Vaping Use    Vaping Use: Former   Substance Use Topics    Alcohol use: Yes     Alcohol/week: 2.0 - 3.0 standard drinks     Types: 2 - 3 Cans of beer per week     Comment: every other day drinker    Drug use: Never       Home Medications:  No current facility-administered medications on file prior to encounter.      Current Outpatient Medications on File Prior to Encounter   Medication Sig Dispense Refill    Aspirin-Acetaminophen-Caffeine (EXCEDRIN PO) Take 2 tablets by mouth daily as needed (migraines)      lisinopril (PRINIVIL;ZESTRIL) 20 MG tablet Take 1 tablet by mouth daily      pantoprazole (PROTONIX) 40 MG tablet Take 1 tablet by mouth daily      hydrOXYzine (VISTARIL) 25 MG capsule take 1 capsule by oral route 3 times every day as needed for panic attack/breakthrough anxiety  atorvastatin (LIPITOR) 20 MG tablet take 1 tablet by oral route every day for High cholesterol      busPIRone (BUSPAR) 5 MG tablet take 1 tablet by oral route 3 times every day      D3 SUPER STRENGTH 50 MCG (2000 UT) CAPS take 1 Capsule by Oral route every day         Review Of Systems:    Constitutional: Negative for  weight loss +subjective fevers and chills in the last 2 weeks  HENT: Negative for congestion, facial swelling and bloody nose  Eyes: Negative for  vision changes  Respiratory: Negative for shortness of breath, difficulty breathing  Cardiovascular: Negative for chest wall pain. Gastrointestinal: +abdominal distention, abdominal pain and vomiting. Genitourinary: Negative for  hematuria  Musculoskeletal: Negative for gait difficulties   Skin: Negative for bruising, abrasions  Neurological: Negative for dizziness, weakness and light-headedness. Hematological: Negative for easy bruising/bleeding  Psychiatric/Behavioral: Negative for behavioral problems. Except as noted above the remainder of the review of systems was reviewed and negative. PHYSICAL EXAM:  Vitals: BP (!) 174/103   Pulse 92   Temp 98 °F (36.7 °C) (Oral)   Resp 20   Ht 5' 7\" (1.702 m)   Wt 180 lb (81.6 kg)   SpO2 97%   BMI 28.19 kg/m²   Constituational: appears uncomfortable, turning in bed  Cardiovascular: Regular rate and rhythm. Pulmonary: Clear to auscultation bilaterally. No acute distress or labored breathing. Symmetric chest rise. Abdominal: Soft. Distended, tender to palpation in mid and lower abdomen with voluntary guarding and rebound tenderness, no tenderness to percussion  Musculoskeletal: Good ROM in all extremities. Neurological: Alert, awake, and oriented x 3. Motor and sensory grossly intact. GCS of 15. No focal deficits.        I reexamined patient again at 1330 - sitting at edge of bed, appears uncomfortable, abdomen distended, slightly less tender to palpation from prior, most of pain bandlike at level of umbilicus    BASIC LABS:  CBC with Differential:    Lab Results   Component Value Date    WBC 4.5 06/07/2022    RBC 4.48 06/07/2022    HGB 15.0 06/07/2022    HCT 42.9 06/07/2022     06/07/2022    MCV 95.6 06/07/2022    MCH 33.4 06/07/2022    MCHC 34.9 06/07/2022    RDW 14.3 06/07/2022    SEGSPCT 56.6% 01/31/2016    LYMPHOPCT 17.2 06/07/2022    MONOPCT 7.5 06/07/2022    BASOPCT 0.9 06/07/2022    MONOSABS 0.3 06/07/2022    LYMPHSABS 0.8 06/07/2022    EOSABS 0.1 06/07/2022    BASOSABS 0.0 06/07/2022     CMP:    Lab Results   Component Value Date     06/07/2022    K 3.4 06/07/2022     06/07/2022    CO2 24 06/07/2022    BUN 7 06/07/2022    CREATININE 1.14 06/07/2022    GFRAA >60.0 06/07/2022    AGRATIO 0.9 09/30/2015    LABGLOM >60.0 06/07/2022    GLUCOSE 108 06/07/2022    GLUCOSE 95 10/04/2021    PROT 6.8 06/07/2022    LABALBU 4.2 06/07/2022    LABALBU 4.8 10/03/2021    CALCIUM 8.4 06/07/2022    BILITOT 0.6 06/07/2022    ALKPHOS 79 06/07/2022    AST 42 06/07/2022    ALT 32 06/07/2022     Magnesium:  Lab Results   Component Value Date    MG 1.9 06/07/2022     Troponin:    Lab Results   Component Value Date    TROPONINI <0.010 06/07/2022         IMAGING:  KUB - large amount of free air under the diaphragm - position: erect  CXR - free air under the diaphragm, less than KUB 75 minutes prior - position:erect    CT A/P: large amount of free air without free fluid    ASSESSMENT/RECOMMENDATIONS:  60y male with colonic perforation after colonoscopy with biopsy.    -admit to general surgery, ICU  - tachycardia and hypoxia are related to pain and splinting.  -NPO, serial abdominal exams  -zosyn  -dilaudid for pain control  -I discussed with the patient that depending on the size of the perforation, it may seal with bowel rest but alternatively may require surgery if pain does not improve with nonoperative management - treatment will depend on clinical course, will tentatively plan for repeat CT scan with rectal contrast first thing tomorrow am to determine if there is still a leak and where      Sasha Braden MD  Emergency General Surgery  [phone] ([phone]) [phone]

## 2022-06-07 NOTE — ANESTHESIA PRE PROCEDURE
Department of Anesthesiology  Preprocedure Note       Name:  Vicky Vargas   Age:  62 y.o.  :  1965                                          MRN:  [id number]         Date:  2022      Surgeon: Johnny Monsivais):  Elmer Lui MD    Procedure: Procedure(s):  COLORECTAL CANCER SCREENING, HIGH RISK    Medications prior to admission:   Prior to Admission medications    Medication Sig Start Date End Date Taking? Authorizing Provider   Aspirin-Acetaminophen-Caffeine (EXCEDRIN PO) Take 2 tablets by mouth daily as needed (migraines)    Historical Provider, MD   lisinopril (PRINIVIL;ZESTRIL) 20 MG tablet Take 1 tablet by mouth daily    Historical Provider, MD   pantoprazole (PROTONIX) 40 MG tablet Take 1 tablet by mouth daily 10/4/21   Historical Provider, MD   hydrOXYzine (VISTARIL) 25 MG capsule take 1 capsule by oral route 3 times every day as needed for panic attack/breakthrough anxiety 22   Historical Provider, MD   atorvastatin (LIPITOR) 20 MG tablet take 1 tablet by oral route every day for High cholesterol 22   Historical Provider, MD   busPIRone (BUSPAR) 5 MG tablet take 1 tablet by oral route 3 times every day 22   Historical Provider, MD   D3 SUPER STRENGTH 50 MCG ( UT) CAPS take 1 Capsule by Oral route every day 22   Historical Provider, MD       Current medications:    Current Facility-Administered Medications   Medication Dose Route Frequency Provider Last Rate Last Admin    0.9 % sodium chloride infusion   IntraVENous Continuous Elmer Lui  mL/hr at 22 0711 NoRateChange at 22 0711       Allergies:  No Known Allergies    Problem List:    Patient Active Problem List   Diagnosis Code    Kidney stones N20.0    Avascular necrosis of bone ([address] M87.00    Effusion of joint M25.40    Acute medial meniscal tear S83.249A    ADHD F90.9    Anxiety F41.9    Depression F32. A    Chest pain R07.9    Angina at rest [facility]) I20.8       Past Medical History: Diagnosis Date    Alcohol abuse, in remission     Backache, unspecified     Chronic gastric ulcer without mention of hemorrhage or perforation, with obstruction     Decreased libido     Depressive disorder, not elsewhere classified     Generalized anxiety disorder     Other malaise and fatigue     Reflux esophagitis     Unspecified essential hypertension     Uric acid nephrolithiasis        Past Surgical History:        Procedure Laterality Date    ANTERIOR CRUCIATE LIGAMENT REPAIR      BACK SURGERY      COLONOSCOPY  2012    LITHOTRIPSY      TONSILLECTOMY         Social History:    Social History     Tobacco Use    Smoking status: Current Every Day Smoker     Packs/day: 0.50     Years: 35.00     Pack years: 17.50    Smokeless tobacco: Never Used   Substance Use Topics    Alcohol use: Yes     Alcohol/week: 2.0 - 3.0 standard drinks     Types: 2 - 3 Cans of beer per week     Comment: every other day drinker                                Ready to quit: Yes  Counseling given: Not Answered      Vital Signs (Current):   Vitals:    06/07/22 0642   BP: (!) 161/89   Pulse: 68   Resp: 16   Temp: 36.4 °C (97.6 °F)   TempSrc: Temporal   SpO2: 97%   Weight: 180 lb (81.6 kg)   Height: 5' 7\" (1.702 m)                                              BP Readings from Last 3 Encounters:   06/07/22 (!) 161/89   05/09/22 130/76   04/04/21 127/69       NPO Status: Time of last liquid consumption: 1200                        Time of last solid consumption: 1100                        Date of last liquid consumption: 06/06/22                        Date of last solid food consumption: 06/04/22    BMI:   Wt Readings from Last 3 Encounters:   06/07/22 180 lb (81.6 kg)   05/09/22 180 lb (81.6 kg)   04/04/21 177 lb 14.6 oz (80.7 kg)     Body mass index is 28.19 kg/m².     CBC:   Lab Results   Component Value Date    WBC 10.1 11/19/2021    RBC 4.78 11/19/2021    HGB 16.6 11/19/2021    HCT 49.4 11/19/2021    .3 11/19/2021    RDW 13.7 11/19/2021     11/19/2021       CMP:   Lab Results   Component Value Date     11/19/2021    K 3.8 11/19/2021     11/19/2021    CO2 23 11/19/2021    BUN 8 11/19/2021    CREATININE 0.79 11/19/2021    GFRAA >60.0 11/19/2021    AGRATIO 0.9 09/30/2015    LABGLOM >60.0 11/19/2021    GLUCOSE 90 11/19/2021    GLUCOSE 95 10/04/2021    PROT 7.7 11/19/2021    CALCIUM 9.8 11/19/2021    BILITOT 0.3 11/19/2021    ALKPHOS 78 11/19/2021    AST 19 11/19/2021    ALT 26 11/19/2021       POC Tests: No results for input(s): POCGLU, POCNA, POCK, POCCL, POCBUN, POCHEMO, POCHCT in the last 72 hours. Coags:   Lab Results   Component Value Date    PROTIME 11.5 10/04/2021    INR 1.0 10/04/2021    APTT 27.2 06/02/2015       HCG (If Applicable): No results found for: PREGTESTUR, PREGSERUM, HCG, HCGQUANT     ABGs:   Lab Results   Component Value Date    PHART 7.339 04/03/2021    PO2ART 73 04/03/2021    NPX9ZDC 42 04/03/2021    KYG7KKR 22.7 04/03/2021    BEART -3 04/03/2021    Z7LZAGUB 93 04/03/2021        Type & Screen (If Applicable):  No results found for: LABABO, LABRH    Drug/Infectious Status (If Applicable):  No results found for: HIV, HEPCAB    COVID-19 Screening (If Applicable):   Lab Results   Component Value Date    COVID19 NOT DETECTED 10/02/2021           Anesthesia Evaluation  Patient summary reviewed and Nursing notes reviewed no history of anesthetic complications:   Airway: Mallampati: III  TM distance: >3 FB   Neck ROM: full  Mouth opening: < 3 FB   Dental: normal exam         Pulmonary:   (+) current smoker                           Cardiovascular:    (+) hypertension:, angina:, hyperlipidemia      ECG reviewed                        Neuro/Psych:   (+) psychiatric history (adhd):depression/anxiety             GI/Hepatic/Renal:   (+) GERD:, PUD, renal disease: kidney stones,           Endo/Other:                     Abdominal:             Vascular:           Other Findings: Anesthesia Plan      MAC     ASA 3       Induction: intravenous. Anesthetic plan and risks discussed with patient.                         Denver Pu, APRN - CRNA   6/7/2022

## 2022-06-07 NOTE — PROGRESS NOTES
[facility] Dose Adjustment Per Protocol:  Zosyn Extended Interval Interchange      Alireza Erickson is a 62 y.o. male. The following ordered dose of Zosyn has been changed to optimize its pharmacodynamic profile per [facility] pharmacy policy approved by [facility]. Recent Labs     06/07/22  1030   CREATININE 1.14   WBC 4.5*     . Height: 5' 7\" (170.2 cm), Weight: 180 lb (81.6 kg), Body mass index is 28.19 kg/m². Estimated Creatinine Clearance: 73 mL/min (based on SCr of 1.14 mg/dL). Medication Ordered:   Traditional Dosing   [] Zosyn 2.25 gm q6-8 hr   [] Zosyn 3.375 gm q6-8 hr   [x] Zosyn 4.5 gm q6-8 hr   [] Zosyn 2.25 gm q6-8 hr   [] Zosyn 3.375 gm q6-8 hr   [x] Zosyn 4.5 gm q6-8 hr     New Dose      Piperacillin/Tazobactam - Extended Infusion Dosing (4-hour infusion) - Preferred Dosing Strategy       Renal Function (CrCl mL/min)  ? 20  < 20, HD, PD  CRRT    All Indications - Loading dose of 4500 milligrams x 1 over 30 minutes or via IV push. Maintenance dose  should begin 6 hours after load for CrCl > 20 and 8 hours after load for CrCl < 20. Maintenance dosing for all indications except as outlined below  [] 3375mg q8h  [] 3375mg q12h  [x] 3375mg q8h    Febrile neutropenia, Cystic fibrosis, BMI > 40*, local Pseudomonas susceptibility < 80% (refer to page 3 for indications)     [] 4500mg q8h  [] 4500 q12h  [] 4500mg q8h    *Consider 3375mg q8h for indication of Urinary tract infections in BMI > 40. Adjust for decreased renal function.            Thank Cheron Snellen [address] [place] [address]  6/7/2022 1:43 PM

## 2022-06-07 NOTE — H&P
Patient Name: Rochelle Monreal  : 1965  MRN: [id number]  DATE: 22      ENDOSCOPY  History and Physical    Procedure:    [x] Diagnostic Colonoscopy       [] Screening Colonoscopy  [] EGD      [] ERCP      [] EUS       [] Other    [x] Previous office notes/History and Physical reviewed from the patients chart. Please see EMR for further details of HPI. I have examined the patient's status immediately prior to the procedure and:      Indications/HPI:    []Abdominal Pain  []Cancer- GI/Lung  []Fhx of colon CA/polyps  []History of Polyps  []Emersons   []Melena  []Abnormal Imaging  []Dysphagia    []Persistent Pneumonia  []Anemia  []Food Impaction  [x]History of Polyps  []GI Bleed  []Pulmonary nodule/Mass  []Change in bowel habits []Heartburn/Reflux  []Rectal Bleed (BRBPR)  []Chest Pain - Non Cardiac []Heme (+) Stoo  l[]Ulcers  []Constipation  []Hemoptysis   []Varices  []Diarrhea  []Hypoxemia  []Nausea/Vomiting  []Screening   []Crohns/Colitis  []Other:     Anesthesia:   [x] MAC [] Moderate Sedation   [] General   [] None     ROS: 12 pt Review of Symptoms was negative unless mentioned above    Medications:   Prior to Admission medications    Medication Sig Start Date End Date Taking?  Authorizing Provider   Aspirin-Acetaminophen-Caffeine (EXCEDRIN PO) Take 2 tablets by mouth daily as needed (migraines)    Historical Provider, MD   lisinopril (PRINIVIL;ZESTRIL) 20 MG tablet Take 1 tablet by mouth daily    Historical Provider, MD   pantoprazole (PROTONIX) 40 MG tablet Take 1 tablet by mouth daily 10/4/21   Historical Provider, MD   hydrOXYzine (VISTARIL) 25 MG capsule take 1 capsule by oral route 3 times every day as needed for panic attack/breakthrough anxiety 22   Historical Provider, MD   atorvastatin (LIPITOR) 20 MG tablet take 1 tablet by oral route every day for High cholesterol 22   Historical Provider, MD   busPIRone (BUSPAR) 5 MG tablet take 1 tablet by oral route 3 times every day 22 Historical Provider, MD   D3 SUPER STRENGTH 48 MCG (2000 UT) CAPS take 1 Capsule by Oral route every day 4/26/22   Historical Provider, MD       Allergies: No Known Allergies     History of allergic reaction to anesthesia:  No    Past Medical History:  Past Medical History:   Diagnosis Date    Alcohol abuse, in remission     Backache, unspecified     Chronic gastric ulcer without mention of hemorrhage or perforation, with obstruction     Decreased libido     Depressive disorder, not elsewhere classified     Generalized anxiety disorder     Other malaise and fatigue     Reflux esophagitis     Unspecified essential hypertension     Uric acid nephrolithiasis        Past Surgical History:  Past Surgical History:   Procedure Laterality Date    ANTERIOR CRUCIATE LIGAMENT REPAIR      BACK SURGERY      COLONOSCOPY  2012    LITHOTRIPSY      TONSILLECTOMY         Social History:  Social History     Tobacco Use    Smoking status: Current Every Day Smoker     Packs/day: 0.50     Years: 35.00     Pack years: 17.50    Smokeless tobacco: Never Used   Vaping Use    Vaping Use: Former   Substance Use Topics    Alcohol use: Yes     Alcohol/week: 2.0 - 3.0 standard drinks     Types: 2 - 3 Cans of beer per week     Comment: every other day drinker    Drug use: Never       Vital Signs:   Vitals:    06/07/22 0642   BP: (!) 161/89   Pulse: 68   Resp: 16   Temp: 97.6 °F (36.4 °C)   SpO2: 97%        Physical Exam:  Cardiac:  [x]WNL  []Comments:  Pulmonary:  [x]WNL   []Comments:   Neuro/Mental Status:  [x]WNL  []Comments:  Abdominal:  [x]WNL    []Comments:  Other:   []WNL  []Comments:    Informed Consent:  The risks and benefits of the procedure have been discussed with either the patient or if they cannot consent, their representative. Assessment:  Patient examined and appropriate for planned sedation and procedure. Plan:  Proceed with planned sedation and procedure as above.     Brissa Palma MD  7:17 AM

## 2022-06-07 NOTE — ACP (ADVANCE CARE PLANNING)
Patient was asleep at the time of this writer's visitor visit. Patient's primary medical decision maker was in the room. We spoke regarding the AD. Jacky expressed that they are familiar with the documents. She was instructed to have her nurse contact Spiritual care if further assistance was required.

## 2022-06-08 ENCOUNTER — APPOINTMENT (OUTPATIENT)
Dept: CT IMAGING | Age: 57
DRG: 254 | End: 2022-06-08
Payer: MEDICARE

## 2022-06-08 VITALS
RESPIRATION RATE: 21 BRPM | DIASTOLIC BLOOD PRESSURE: 95 MMHG | HEIGHT: 67 IN | TEMPERATURE: 98 F | WEIGHT: 164.24 LBS | SYSTOLIC BLOOD PRESSURE: 126 MMHG | BODY MASS INDEX: 25.78 KG/M2 | OXYGEN SATURATION: 89 % | HEART RATE: 86 BPM

## 2022-06-08 LAB
ANION GAP SERPL CALCULATED.3IONS-SCNC: 17 MEQ/L (ref 9–15)
ATYPICAL LYMPHOCYTE RELATIVE PERCENT: 1 %
BASOPHILS ABSOLUTE: 0.1 K/UL (ref 0–0.2)
BASOPHILS RELATIVE PERCENT: 1 %
BUN BLDV-MCNC: 9 MG/DL (ref 6–20)
CALCIUM SERPL-MCNC: 8.2 MG/DL (ref 8.5–9.9)
CHLORIDE BLD-SCNC: 109 MEQ/L (ref 95–107)
CO2: 18 MEQ/L (ref 20–31)
CREAT SERPL-MCNC: 1.03 MG/DL (ref 0.7–1.2)
EOSINOPHILS ABSOLUTE: 0.1 K/UL (ref 0–0.7)
EOSINOPHILS RELATIVE PERCENT: 1 %
GFR AFRICAN AMERICAN: >60
GFR NON-AFRICAN AMERICAN: >60
GLUCOSE BLD-MCNC: 114 MG/DL (ref 70–99)
HCT VFR BLD CALC: 39.2 % (ref 42–52)
HEMOGLOBIN: 13.6 G/DL (ref 14–18)
LACTIC ACID: 2.4 MMOL/L (ref 0.5–2.2)
LYMPHOCYTES ABSOLUTE: 0.8 K/UL (ref 1–4.8)
LYMPHOCYTES RELATIVE PERCENT: 7 %
MCH RBC QN AUTO: 34.3 PG (ref 27–31.3)
MCHC RBC AUTO-ENTMCNC: 34.6 % (ref 33–37)
MCV RBC AUTO: 99.1 FL (ref 80–100)
MONOCYTES ABSOLUTE: 0.2 K/UL (ref 0.2–0.8)
MONOCYTES RELATIVE PERCENT: 1.9 %
NEUTROPHILS ABSOLUTE: 8.5 K/UL (ref 1.4–6.5)
NEUTROPHILS RELATIVE PERCENT: 89 %
PDW BLD-RTO: 14.6 % (ref 11.5–14.5)
PLATELET # BLD: 178 K/UL (ref 130–400)
PLATELET SLIDE REVIEW: NORMAL
POTASSIUM REFLEX MAGNESIUM: 4 MEQ/L (ref 3.4–4.9)
RBC # BLD: 3.96 M/UL (ref 4.7–6.1)
SODIUM BLD-SCNC: 144 MEQ/L (ref 135–144)
STOMATOCYTES: ABNORMAL
WBC # BLD: 9.6 K/UL (ref 4.8–10.8)

## 2022-06-08 PROCEDURE — 2700000000 HC OXYGEN THERAPY PER DAY

## 2022-06-08 PROCEDURE — 85025 COMPLETE CBC W/AUTO DIFF WBC: CPT

## 2022-06-08 PROCEDURE — 83605 ASSAY OF LACTIC ACID: CPT

## 2022-06-08 PROCEDURE — 74177 CT ABD & PELVIS W/CONTRAST: CPT

## 2022-06-08 PROCEDURE — A4216 STERILE WATER/SALINE, 10 ML: HCPCS | Performed by: SURGERY

## 2022-06-08 PROCEDURE — 6360000002 HC RX W HCPCS: Performed by: PHYSICIAN ASSISTANT

## 2022-06-08 PROCEDURE — C9113 INJ PANTOPRAZOLE SODIUM, VIA: HCPCS | Performed by: SURGERY

## 2022-06-08 PROCEDURE — 6360000002 HC RX W HCPCS: Performed by: SURGERY

## 2022-06-08 PROCEDURE — 6370000000 HC RX 637 (ALT 250 FOR IP): Performed by: PHYSICIAN ASSISTANT

## 2022-06-08 PROCEDURE — 36415 COLL VENOUS BLD VENIPUNCTURE: CPT

## 2022-06-08 PROCEDURE — 2500000003 HC RX 250 WO HCPCS: Performed by: SURGERY

## 2022-06-08 PROCEDURE — 99239 HOSP IP/OBS DSCHRG MGMT >30: CPT | Performed by: PHYSICIAN ASSISTANT

## 2022-06-08 PROCEDURE — 2580000003 HC RX 258: Performed by: SURGERY

## 2022-06-08 PROCEDURE — 80048 BASIC METABOLIC PNL TOTAL CA: CPT

## 2022-06-08 PROCEDURE — 6360000004 HC RX CONTRAST MEDICATION: Performed by: NURSE PRACTITIONER

## 2022-06-08 RX ORDER — HYDRALAZINE HYDROCHLORIDE 20 MG/ML
10 INJECTION INTRAMUSCULAR; INTRAVENOUS EVERY 4 HOURS PRN
Status: DISCONTINUED | OUTPATIENT
Start: 2022-06-08 | End: 2022-06-08 | Stop reason: HOSPADM

## 2022-06-08 RX ORDER — LISINOPRIL 20 MG/1
20 TABLET ORAL DAILY
Status: DISCONTINUED | OUTPATIENT
Start: 2022-06-08 | End: 2022-06-08 | Stop reason: HOSPADM

## 2022-06-08 RX ORDER — OXYCODONE HYDROCHLORIDE 5 MG/1
5 TABLET ORAL EVERY 4 HOURS PRN
Status: DISCONTINUED | OUTPATIENT
Start: 2022-06-08 | End: 2022-06-08 | Stop reason: HOSPADM

## 2022-06-08 RX ORDER — ATORVASTATIN CALCIUM 20 MG/1
20 TABLET, FILM COATED ORAL DAILY
Status: DISCONTINUED | OUTPATIENT
Start: 2022-06-08 | End: 2022-06-08 | Stop reason: HOSPADM

## 2022-06-08 RX ORDER — SODIUM CHLORIDE, SODIUM LACTATE, POTASSIUM CHLORIDE, CALCIUM CHLORIDE 600; 310; 30; 20 MG/100ML; MG/100ML; MG/100ML; MG/100ML
INJECTION, SOLUTION INTRAVENOUS CONTINUOUS
Status: DISCONTINUED | OUTPATIENT
Start: 2022-06-08 | End: 2022-06-08 | Stop reason: HOSPADM

## 2022-06-08 RX ORDER — ACETAMINOPHEN 325 MG/1
650 TABLET ORAL EVERY 6 HOURS
Status: DISCONTINUED | OUTPATIENT
Start: 2022-06-08 | End: 2022-06-08 | Stop reason: HOSPADM

## 2022-06-08 RX ORDER — AMOXICILLIN AND CLAVULANATE POTASSIUM 875; 125 MG/1; MG/1
1 TABLET, FILM COATED ORAL EVERY 8 HOURS
Qty: 9 TABLET | Refills: 0 | Status: SHIPPED | OUTPATIENT
Start: 2022-06-08 | End: 2022-06-11

## 2022-06-08 RX ADMIN — HYDROMORPHONE HYDROCHLORIDE 0.25 MG: 1 INJECTION, SOLUTION INTRAMUSCULAR; INTRAVENOUS; SUBCUTANEOUS at 13:11

## 2022-06-08 RX ADMIN — HYDROMORPHONE HYDROCHLORIDE 1 MG: 1 INJECTION, SOLUTION INTRAMUSCULAR; INTRAVENOUS; SUBCUTANEOUS at 08:11

## 2022-06-08 RX ADMIN — PIPERACILLIN AND TAZOBACTAM 3375 MG: 3; .375 INJECTION, POWDER, LYOPHILIZED, FOR SOLUTION INTRAVENOUS at 05:57

## 2022-06-08 RX ADMIN — LABETALOL HYDROCHLORIDE 20 MG: 5 INJECTION, SOLUTION INTRAVENOUS at 02:25

## 2022-06-08 RX ADMIN — IOPAMIDOL 100 ML: 755 INJECTION, SOLUTION INTRAVENOUS at 05:31

## 2022-06-08 RX ADMIN — ENOXAPARIN SODIUM 40 MG: 100 INJECTION SUBCUTANEOUS at 08:12

## 2022-06-08 RX ADMIN — HYDRALAZINE HYDROCHLORIDE 10 MG: 20 INJECTION, SOLUTION INTRAMUSCULAR; INTRAVENOUS at 10:15

## 2022-06-08 RX ADMIN — LISINOPRIL 20 MG: 20 TABLET ORAL at 10:49

## 2022-06-08 RX ADMIN — HYDROMORPHONE HYDROCHLORIDE 1 MG: 1 INJECTION, SOLUTION INTRAMUSCULAR; INTRAVENOUS; SUBCUTANEOUS at 06:11

## 2022-06-08 RX ADMIN — ATORVASTATIN CALCIUM 20 MG: 20 TABLET, FILM COATED ORAL at 10:49

## 2022-06-08 RX ADMIN — HYDROMORPHONE HYDROCHLORIDE 1 MG: 1 INJECTION, SOLUTION INTRAMUSCULAR; INTRAVENOUS; SUBCUTANEOUS at 02:25

## 2022-06-08 RX ADMIN — DIATRIZOATE MEGLUMINE AND DIATRIZOATE SODIUM 30 ML: 600; 100 SOLUTION ORAL; RECTAL at 05:32

## 2022-06-08 RX ADMIN — HYDRALAZINE HYDROCHLORIDE 10 MG: 20 INJECTION, SOLUTION INTRAMUSCULAR; INTRAVENOUS at 08:11

## 2022-06-08 RX ADMIN — SODIUM CHLORIDE, PRESERVATIVE FREE 10 ML: 5 INJECTION INTRAVENOUS at 08:12

## 2022-06-08 RX ADMIN — PANTOPRAZOLE SODIUM 40 MG: 40 INJECTION, POWDER, FOR SOLUTION INTRAVENOUS at 08:12

## 2022-06-08 RX ADMIN — LABETALOL HYDROCHLORIDE 20 MG: 5 INJECTION, SOLUTION INTRAVENOUS at 06:25

## 2022-06-08 RX ADMIN — ACETAMINOPHEN 650 MG: 325 TABLET ORAL at 10:49

## 2022-06-08 RX ADMIN — PIPERACILLIN AND TAZOBACTAM 3375 MG: 3; .375 INJECTION, POWDER, LYOPHILIZED, FOR SOLUTION INTRAVENOUS at 12:45

## 2022-06-08 RX ADMIN — SODIUM CHLORIDE, POTASSIUM CHLORIDE, SODIUM LACTATE AND CALCIUM CHLORIDE: 600; 310; 30; 20 INJECTION, SOLUTION INTRAVENOUS at 02:28

## 2022-06-08 RX ADMIN — HYDROMORPHONE HYDROCHLORIDE 1 MG: 1 INJECTION, SOLUTION INTRAMUSCULAR; INTRAVENOUS; SUBCUTANEOUS at 00:17

## 2022-06-08 RX ADMIN — HYDROMORPHONE HYDROCHLORIDE 1 MG: 1 INJECTION, SOLUTION INTRAMUSCULAR; INTRAVENOUS; SUBCUTANEOUS at 10:15

## 2022-06-08 ASSESSMENT — PAIN DESCRIPTION - LOCATION
LOCATION: ABDOMEN

## 2022-06-08 ASSESSMENT — PAIN SCALES - GENERAL
PAINLEVEL_OUTOF10: 9
PAINLEVEL_OUTOF10: 8
PAINLEVEL_OUTOF10: 9
PAINLEVEL_OUTOF10: 3
PAINLEVEL_OUTOF10: 8
PAINLEVEL_OUTOF10: 9
PAINLEVEL_OUTOF10: 5
PAINLEVEL_OUTOF10: 3
PAINLEVEL_OUTOF10: 8
PAINLEVEL_OUTOF10: 8
PAINLEVEL_OUTOF10: 5

## 2022-06-08 ASSESSMENT — PAIN DESCRIPTION - PAIN TYPE: TYPE: SURGICAL PAIN

## 2022-06-08 ASSESSMENT — PAIN DESCRIPTION - ONSET
ONSET: ON-GOING

## 2022-06-08 ASSESSMENT — PAIN DESCRIPTION - FREQUENCY
FREQUENCY: CONTINUOUS

## 2022-06-08 ASSESSMENT — PAIN DESCRIPTION - DESCRIPTORS
DESCRIPTORS: ACHING;DISCOMFORT

## 2022-06-08 ASSESSMENT — PAIN - FUNCTIONAL ASSESSMENT
PAIN_FUNCTIONAL_ASSESSMENT: ACTIVITIES ARE NOT PREVENTED
PAIN_FUNCTIONAL_ASSESSMENT: ACTIVITIES ARE NOT PREVENTED
PAIN_FUNCTIONAL_ASSESSMENT: PREVENTS OR INTERFERES SOME ACTIVE ACTIVITIES AND ADLS

## 2022-06-08 NOTE — DISCHARGE SUMMARY
[address]  [address]  [place], [address] [place] Veronica Lopez  MRN: [id number]  YOB: 1965  62 y. o.male      Attending  No att. providers found ? Date of Admission  6/7/2022 Date of Discharge  6/8/2022      ? DIAGNOSES:  Principal Problem:    Perforated abdominal viscus  Resolved Problems:    * No resolved hospital problems. *         PROCEDURES:  6/7/2022: colonoscopy with GI (Dr. Scooter Owusu) -- pre admission      DISCHARGE MEDICATIONS:   Discharge Medication List as of 6/8/2022  3:15 PM             Details   amoxicillin-clavulanate (AUGMENTIN) 875-125 MG per tablet Take 1 tablet by mouth every 8 hours for 3 days, Disp-9 tablet, R-0Normal                Details   Aspirin-Acetaminophen-Caffeine (EXCEDRIN PO) Take 2 tablets by mouth daily as needed (migraines)Historical Med      lisinopril (PRINIVIL;ZESTRIL) 20 MG tablet Take 1 tablet by mouth dailyHistorical Med      pantoprazole (PROTONIX) 40 MG tablet Take 1 tablet by mouth dailyHistorical Med      hydrOXYzine (VISTARIL) 25 MG capsule take 1 capsule by oral route 3 times every day as needed for panic attack/breakthrough anxietyHistorical Med      atorvastatin (LIPITOR) 20 MG tablet take 1 tablet by oral route every day for High cholesterolHistorical Med      busPIRone (BUSPAR) 5 MG tablet take 1 tablet by oral route 3 times every dayHistorical Med      D3 SUPER STRENGTH 50 MCG (2000 UT) CAPS take 1 Capsule by Oral route every day, DAWHistorical Med             REASON FOR HOSPITALIZATION:  Veronica Lopez is a 62 y.o. male with history of gastric ulcers, HTN, and HL who presented on 6/7/2022 to [facility] for screening colonoscopy. He had multiple polyps noted, most of which were biopsied with a cold snare however one in the descending and sigmoid colon was pedunculated and biopsied with a hot snare. The patient developed abdominal pain in the recovery room and a KUB was obtained that showed free air.  Dr. Scooter Owusu consulted [facility] regarding these findings. Patient admitted to [facility] ICU for CT abd/pelvis with IV and rectal contrast, pain control, and monitoring of pneumoperitoneum. SIGNIFICANT FINDINGS:  Catalog of Injuries:   1) colonic perforation after colonoscopy with biopsy (6/7/2022)  2) acute pain     Incidental Findings: 5 mm left renal nonobstructing stone (patient aware)       HOSPITAL COURSE:  6/7/2022: Colonoscopy outpatient with Dr. Jeremias Guo (GI). Colonic perforation after colonoscopy. EGS consulted. Patient admitted to ICU with pneumoperitoneum  6/8/2022: CT abd/pelvis with IV and rectal contrast with stable pneumoperitoneum and without extravasation. Requiring IV dilaudid q2hr for pain control. Physical exam still with abdominal distention and tympany but with improved rebound TTP. Plan made with patient in AM during rounds to transition to [facility], obtain KUB/CXR in AM to monitor stability/resolution of pneumoperitoneum, and repeat lactic acid level. Patient agreeable in AM. However, patient in afternoon adamant that he wants to leave AMA. Says \"I don't want to eugenio anyone, I just want to leave\". Expresses that he is worried about part-time job, sick parents, and \"other stuff [he] has to do\". Education provided to patient regarding importance of monitoring patient as diet is advanced. Patient left AMA. prescription for 3 days of Augmentin provided. Advised patient to return to ED for worsening abdominal pain. The patient was seen and examined on the day of discharge with the following findings:  Constitutional: Standing up in room, removing IV on his own, significant other at bedside  Respiratory: non-labored breathing on RA  Cardiovascular: DP/PT 2+ bilaterally. RRR  Abdominal: Soft. Distended. Tympanic. TTP mid to lower abdomen. Rebound TTP present but improved from previous day. MSK: good ROM in extremities  Neurological: A&Ox3. GCS 15.  Motor and sensory grossly intact        ANTICIPATED FOLLOW UP:  Future Appointments   Date Time Provider Sharda Mantilla   6/17/2022  9:45 AM Jessica Mackenzie MD [facility] NORMA Couch         VTE RISK AT DISCHARGE:  Per trauma program protocol, the patient does not require post-discharge VTE prophylaxis due to: NWB single LE or BLE fractures limiting mobility. --  Lala Castellanos PA-C  Trauma/Critical Care  Emergency General Surgery  [phone] (3P-6K)  [phone]      35 minutes were spent on the discharge of this patient including final examination of the patient, discussion of the hospital stay, instructions for continuing care to all relevant caregivers, preparation of discharge records, prescriptions and referral forms, and clear identification of reasons to return to clinic or to emergency room. Teaching Physician Note    See my separate note from 6/8 for further details. I saw and evaluated the patient and reviewed all labs and imaging in the last 24 hours. I personally obtained the key and critical portions of the history and physical exam.  I reviewed the YUKI's documentation and discussed the patient with the YUKI. I agree with the YUKI's medical decision making as documented in the YUKI note. History and exam by me demonstrates  Pt admitted with pneumoperitoneum after colonoscopy indicated colonic perforation. Observed in ICU overnight. CT with IV and rectal contrast show no extravasation but pt requiring frequent dilaudid doses and still with peritonitis on exam.    Plan/MDM:  We advanced to [facility] and then in afternoon pt suddenly wanted to leave. He had not proven tolerance of regular diet or pain control off narcotics. He chose to leave against medical advice.     Noble Zamora MD  Trauma, Surgical Critical Care, and Emergency General Surgery

## 2022-06-08 NOTE — PLAN OF CARE
Problem: Discharge Planning  Goal: Discharge to home or other facility with appropriate resources  Outcome: Progressing  Flowsheets (Taken 6/7/2022 2000)  Discharge to home or other facility with appropriate resources: Refer to discharge planning if patient needs post-hospital services based on physician order or complex needs related to functional status, cognitive ability or social support system     Problem: Pain  Goal: Verbalizes/displays adequate comfort level or baseline comfort level  Outcome: Progressing  Flowsheets  Taken 6/8/2022 0000  Verbalizes/displays adequate comfort level or baseline comfort level: Encourage patient to monitor pain and request assistance  Taken 6/7/2022 2000  Verbalizes/displays adequate comfort level or baseline comfort level: Encourage patient to monitor pain and request assistance     Problem: Skin/Tissue Integrity  Goal: Absence of new skin breakdown  Description: 1. Monitor for areas of redness and/or skin breakdown  2. Assess vascular access sites hourly  3. Every 4-6 hours minimum:  Change oxygen saturation probe site  4. Every 4-6 hours:  If on nasal continuous positive airway pressure, respiratory therapy assess nares and determine need for appliance change or resting period.   Outcome: Progressing     Problem: Safety - Adult  Goal: Free from fall injury  Outcome: Progressing

## 2022-06-08 NOTE — PROGRESS NOTES
General Surgery    I spoke with pt's nurse at [facility] who stated he was off the floor for CT. Had required several doses of dilaudid overnight. I reviewed his imaging and did not see extravasation of contrast, at 0630, [facility] confirmed no contrast extrav but stable free air. I examined the patient around 1000, he was still distended, tympanitic, a little softer, with tenderness to palpation and guarding at level of umbilicus. Overall improved. Labs show lactate of 2.4, normal WBC. We discussed plan to advance to [facility] and start home medications, repeat exam in afternoon and advance if tolerated. We were called to bedside by RN stating that patient wanted to leave the hospital. I went to evaluate him. He was dressed in jeans and pulling out his IV when I came in. He had just received a dose of IV dilaudid about one hour prior. He stated it didn't work and that he could be at home. He stated that he wants his freedom and has sick parents at home. We discussed the indications for staying in the hospital which would be to continue abx and advance to a regular diet and ensure no complication and/or detect a problem that would require surgery if it were to develop, to avoid an intraabdominal catastrophe. Additionally, he is still receiving IV pain medications and don't think he would be able to manage his pain at home, which may result in a readmission. If he were to develop an abdominal infection that went undetected, at worst, this could result in death. For these reasons, if he chose to leave the hospital, he would be doing so against medical advice. He stated he would think about it. I updated the patient's RN LIN Newton and let her know that if he were to leave, he would have to sign AMA paperwork and that we would provide him an RX for abx. She will notify us of his decision.     Flory Hart MD  Trauma, Surgical Critical Care, and Emergency General Surgery

## 2022-06-08 NOTE — PROGRESS NOTES
Pre-op Instructions For Out-Patient Endoscopy Surgery    Medication Instructions:  · Please stop herbs and any supplements now (includes vitamins and minerals). · Please contact your surgeon and prescribing physician for pre-op instructions for any blood thinners. · If you have inhalers/aerosol treatments at home, please use them the morning of your surgery and bring the inhalers with you to the hospital.    · Please take the following medications the morning of your surgery with a sip of water:    none    Surgery Instructions:  1. After midnight before surgery:  Do not eat or drink anything, including water, mints, gum, and hard candy. You may brush your teeth without swallowing. No smoking, chewing tobacco, or street drugs. 2. Please shower or bathe before surgery. 3. Please do not wear any cologne, lotion, powder, jewelry, piercings, perfume, makeup, nail polish, hair accessories, or hair spray on the day of surgery. Wear loose comfortable clothing. 4. Leave your valuables at home. Bring a storage case for any glasses/contacts. 5. An adult who is responsible for you MUST drive you home and should be with you for the first 24 hours after surgery. The Day of Surgery:  · Arrive at [address] [place] [address] Surgery Entrance at the time directed by your surgeon and check in at the desk. · If you have a living will or healthcare power of , please bring a copy. · You will be taken to the pre-op holding area where you will be prepared for surgery. A physical assessment will be performed by a nurse practitioner or house officer. Your IV will be started and you will meet your anesthesiologist.    · When you go to surgery, your family will be directed to the surgical waiting room, where the doctor should speak with them after your surgery.     · After surgery, you will be taken to the recovery room then when you are awake and stable you will go to the short stay unit for Spiritual Care Services     Summary of Visit:   visited patient and significant other in ICU. Patient felt relief and gratitude that he is getting better and the doctor said he did not need major surgery. Patient did want prayer for recovery and  provided prayer to patient. Spiritual Assessment/Intervention/Outcomes:    Encounter Summary  Encounter Overview/Reason : Spiritual/Emotional Needs  Service Provided For[de-identified] Patient and family together  Referral/Consult From[de-identified] Rounding  Support System: Significant other  Complexity of Encounter: Moderate  Begin Time: 1035  End Time : 1050  Total Time Calculated: 15 min  Encounter   Type: Initial Screen/Assessment     Spiritual/Emotional needs  Type: Spiritual Support                    Values / Beliefs  Do You Have Any Ethnic, Cultural, Sacramental, or Spiritual Mosque Needs You Would Like Us To Be Aware of While You Are in the Hospital : No    Care Plan:    Provide continued prayer and support to patient as needed or requested. Spiritual Care Services   Electronically signed by Nisa Latham on 6/8/22 at 10:56 AM EDT     To reach a  for emotional and spiritual support, place an [facility] [place] consult request.   If a  is needed immediately, dial 0 and ask to page the on-call . preparation to be discharged. Only your one designated person is allowed to come to short stay for your discharge.

## 2022-06-08 NOTE — PROGRESS NOTES
19:00-07:30 Shift summary:    Pt had uneventful night. Assessments completed (see flowsheets). Pt A&Ox4, able to make needs and wants know. Pleasant and cooperative with staff. IV fluids infusing per orders, pt tolerating well. Continent of bladder. Pt c/o ABD pain throughout shift, PRN pain medications administered per pt request and MD order (see eMAR). Pt ambulated from bed to recliner and back to bed with slow steady gait and stand by nurse assist. CT of ABD completed, pt tolerated well. Bedside handoff report given to on coming RN. Safety measures in place.

## 2022-06-08 NOTE — FLOWSHEET NOTE
1500- patient left ama and signed paperwork. Patient ambulatory. Iv x2 out. Trauma team talked with patient before leaving.

## 2022-06-10 LAB
EKG ATRIAL RATE: 67 BPM
EKG P AXIS: 60 DEGREES
EKG P-R INTERVAL: 160 MS
EKG Q-T INTERVAL: 432 MS
EKG QRS DURATION: 124 MS
EKG QTC CALCULATION (BAZETT): 456 MS
EKG R AXIS: 20 DEGREES
EKG T AXIS: 17 DEGREES
EKG VENTRICULAR RATE: 67 BPM

## 2022-06-10 PROCEDURE — 93010 ELECTROCARDIOGRAM REPORT: CPT | Performed by: INTERNAL MEDICINE

## 2022-07-14 LAB
FOLATE: 4.8 NG/ML
TSH SERPL DL<=0.05 MIU/L-ACNC: 1.33 MIU/L (ref 0.44–3.9)
VITAMIN B-12: 259 PG/ML (ref 211–911)
VITAMIN D 25-HYDROXY: 28 NG/ML

## (undated) DEVICE — FORCEPS ENDOSCP BX OVL CUP SERR W/NEEDLE 2.3MM DIA 160CML

## (undated) DEVICE — BRUSH ENDO CLN L90.5IN SHTH DIA1.7MM BRIST DIA5-7MM 2-6MM

## (undated) DEVICE — Device: Brand: ENDO SMARTCAP

## (undated) DEVICE — SINGLE PORT MANIFOLD: Brand: NEPTUNE 2

## (undated) DEVICE — ENDO CARRY-ON PROCEDURE KIT: Brand: ENDO CARRY-ON PROCEDURE KIT

## (undated) DEVICE — SNARE ENDOSCP AD L240CM LOOP W10MM SHTH DIA2.4MM RND INSUL

## (undated) DEVICE — PATIENT RETURN ELECTRODE, SINGLE-USE, NON CONTACT QUALITY MONITORING, ADULT, WITH 9 FT (2.7 M) CORD, FOR PATIENTS WEIGHING OVER 33LBS. (15KG): Brand: MEGADYNE

## (undated) DEVICE — TRAP POLYP BALEEN

## (undated) DEVICE — TUBE SET 96 MM 64 MM H2O PERISTALTIC STD AUX CHANNEL

## (undated) DEVICE — TUBING, SUCTION, 1/4" X 10', STRAIGHT: Brand: MEDLINE